# Patient Record
Sex: MALE | Race: WHITE | NOT HISPANIC OR LATINO
[De-identification: names, ages, dates, MRNs, and addresses within clinical notes are randomized per-mention and may not be internally consistent; named-entity substitution may affect disease eponyms.]

---

## 2017-03-07 ENCOUNTER — APPOINTMENT (OUTPATIENT)
Dept: UROLOGY | Facility: CLINIC | Age: 77
End: 2017-03-07

## 2017-03-07 DIAGNOSIS — R97.20 ELEVATED PROSTATE, SPECIFIC ANTIGEN [PSA]: ICD-10-CM

## 2017-03-07 DIAGNOSIS — Z00.00 ENCOUNTER FOR GENERAL ADULT MEDICAL EXAMINATION W/OUT ABNORMAL FINDINGS: ICD-10-CM

## 2017-03-07 LAB
PSA FREE FLD-MCNC: 19.3 %
PSA FREE SERPL-MCNC: 0.76 NG/ML
PSA SERPL-MCNC: 3.96 NG/ML

## 2017-03-08 LAB
APPEARANCE: CLEAR
BACTERIA: NEGATIVE
BILIRUBIN URINE: NEGATIVE
BLOOD URINE: NEGATIVE
COLOR: ABNORMAL
GLUCOSE QUALITATIVE U: NORMAL MG/DL
HYALINE CASTS: 0 /LPF
KETONES URINE: NEGATIVE
LEUKOCYTE ESTERASE URINE: NEGATIVE
MICROSCOPIC-UA: NORMAL
NITRITE URINE: NEGATIVE
PH URINE: 7.5
PROTEIN URINE: NEGATIVE MG/DL
RED BLOOD CELLS URINE: 2 /HPF
SPECIFIC GRAVITY URINE: 1.02
SQUAMOUS EPITHELIAL CELLS: 0 /HPF
UROBILINOGEN URINE: NORMAL MG/DL
WHITE BLOOD CELLS URINE: 0 /HPF

## 2017-03-11 LAB — CORE LAB FLUID CYTOLOGY: NORMAL

## 2017-07-31 ENCOUNTER — APPOINTMENT (OUTPATIENT)
Dept: ORTHOPEDIC SURGERY | Facility: CLINIC | Age: 77
End: 2017-07-31
Payer: MEDICARE

## 2017-07-31 VITALS
TEMPERATURE: 98.1 F | HEIGHT: 72 IN | DIASTOLIC BLOOD PRESSURE: 70 MMHG | WEIGHT: 188 LBS | HEART RATE: 77 BPM | SYSTOLIC BLOOD PRESSURE: 120 MMHG | BODY MASS INDEX: 25.47 KG/M2

## 2017-07-31 DIAGNOSIS — M16.11 UNILATERAL PRIMARY OSTEOARTHRITIS, RIGHT HIP: ICD-10-CM

## 2017-07-31 DIAGNOSIS — M25.851 OTHER SPECIFIED JOINT DISORDERS, RIGHT HIP: ICD-10-CM

## 2017-07-31 PROCEDURE — 99204 OFFICE O/P NEW MOD 45 MIN: CPT

## 2017-07-31 PROCEDURE — 73502 X-RAY EXAM HIP UNI 2-3 VIEWS: CPT | Mod: RT

## 2017-07-31 RX ORDER — FAMOTIDINE 20 MG/1
20 TABLET, FILM COATED ORAL
Qty: 30 | Refills: 0 | Status: ACTIVE | COMMUNITY
Start: 2017-07-12

## 2017-08-02 ENCOUNTER — FORM ENCOUNTER (OUTPATIENT)
Age: 77
End: 2017-08-02

## 2017-08-03 ENCOUNTER — OUTPATIENT (OUTPATIENT)
Dept: OUTPATIENT SERVICES | Facility: HOSPITAL | Age: 77
LOS: 1 days | End: 2017-08-03
Payer: MEDICARE

## 2017-08-03 ENCOUNTER — APPOINTMENT (OUTPATIENT)
Dept: RADIOLOGY | Facility: CLINIC | Age: 77
End: 2017-08-03
Payer: MEDICARE

## 2017-08-03 DIAGNOSIS — Z98.89 OTHER SPECIFIED POSTPROCEDURAL STATES: Chronic | ICD-10-CM

## 2017-08-03 DIAGNOSIS — Z41.9 ENCOUNTER FOR PROCEDURE FOR PURPOSES OTHER THAN REMEDYING HEALTH STATE, UNSPECIFIED: Chronic | ICD-10-CM

## 2017-08-03 DIAGNOSIS — Z00.8 ENCOUNTER FOR OTHER GENERAL EXAMINATION: ICD-10-CM

## 2017-08-03 DIAGNOSIS — Z98.49 CATARACT EXTRACTION STATUS, UNSPECIFIED EYE: Chronic | ICD-10-CM

## 2017-08-03 PROCEDURE — 27093 INJECTION FOR HIP X-RAY: CPT

## 2017-08-03 PROCEDURE — 73525 CONTRAST X-RAY OF HIP: CPT | Mod: 26,RT

## 2017-08-03 PROCEDURE — 27093 INJECTION FOR HIP X-RAY: CPT | Mod: RT

## 2017-08-03 PROCEDURE — 73525 CONTRAST X-RAY OF HIP: CPT

## 2017-08-04 ENCOUNTER — TRANSCRIPTION ENCOUNTER (OUTPATIENT)
Age: 77
End: 2017-08-04

## 2017-08-08 ENCOUNTER — APPOINTMENT (OUTPATIENT)
Dept: ORTHOPEDIC SURGERY | Facility: CLINIC | Age: 77
End: 2017-08-08
Payer: MEDICARE

## 2017-08-08 VITALS — HEART RATE: 90 BPM | SYSTOLIC BLOOD PRESSURE: 135 MMHG | DIASTOLIC BLOOD PRESSURE: 85 MMHG

## 2017-08-08 DIAGNOSIS — M16.12 UNILATERAL PRIMARY OSTEOARTHRITIS, LEFT HIP: ICD-10-CM

## 2017-08-08 DIAGNOSIS — M16.11 UNILATERAL PRIMARY OSTEOARTHRITIS, RIGHT HIP: ICD-10-CM

## 2017-08-08 PROCEDURE — 73522 X-RAY EXAM HIPS BI 3-4 VIEWS: CPT

## 2017-08-08 PROCEDURE — 99214 OFFICE O/P EST MOD 30 MIN: CPT

## 2018-07-28 PROBLEM — M16.11 PRIMARY LOCALIZED OSTEOARTHROSIS OF PELVIC REGION, RIGHT: Status: ACTIVE | Noted: 2017-08-08

## 2018-07-28 PROBLEM — M16.12 PRIMARY LOCALIZED OSTEOARTHROSIS OF PELVIC REGION, LEFT: Status: ACTIVE | Noted: 2017-08-08

## 2018-10-30 ENCOUNTER — APPOINTMENT (OUTPATIENT)
Dept: UROLOGY | Facility: CLINIC | Age: 78
End: 2018-10-30
Payer: MEDICARE

## 2018-10-30 PROCEDURE — 99214 OFFICE O/P EST MOD 30 MIN: CPT

## 2018-10-30 RX ORDER — SILDENAFIL 20 MG/1
20 TABLET ORAL
Qty: 90 | Refills: 3 | Status: ACTIVE | COMMUNITY
Start: 2018-10-30 | End: 1900-01-01

## 2018-10-30 RX ORDER — TADALAFIL 20 MG/1
20 TABLET ORAL
Qty: 12 | Refills: 3 | Status: ACTIVE | COMMUNITY
Start: 2018-10-30 | End: 1900-01-01

## 2018-10-31 LAB
APPEARANCE: CLEAR
BACTERIA: NEGATIVE
BILIRUBIN URINE: NEGATIVE
BLOOD URINE: NEGATIVE
COLOR: ABNORMAL
GLUCOSE QUALITATIVE U: NEGATIVE MG/DL
HYALINE CASTS: 0 /LPF
KETONES URINE: NEGATIVE
LEUKOCYTE ESTERASE URINE: NEGATIVE
MICROSCOPIC-UA: NORMAL
NITRITE URINE: NEGATIVE
PH URINE: 6.5
PROTEIN URINE: NEGATIVE MG/DL
RED BLOOD CELLS URINE: 3 /HPF
SPECIFIC GRAVITY URINE: 1.03
SQUAMOUS EPITHELIAL CELLS: 0 /HPF
UROBILINOGEN URINE: NEGATIVE MG/DL
WHITE BLOOD CELLS URINE: 0 /HPF

## 2018-11-02 LAB — CORE LAB FLUID CYTOLOGY: NORMAL

## 2019-11-13 ENCOUNTER — TRANSCRIPTION ENCOUNTER (OUTPATIENT)
Age: 79
End: 2019-11-13

## 2019-11-14 ENCOUNTER — APPOINTMENT (OUTPATIENT)
Dept: UROLOGY | Facility: CLINIC | Age: 79
End: 2019-11-14
Payer: MEDICARE

## 2019-11-14 PROCEDURE — 99214 OFFICE O/P EST MOD 30 MIN: CPT

## 2019-11-14 NOTE — PHYSICAL EXAM
[General Appearance - Well Developed] : well developed [General Appearance - Well Nourished] : well nourished [Normal Appearance] : normal appearance [Well Groomed] : well groomed [General Appearance - In No Acute Distress] : no acute distress [Abdomen Soft] : soft [Abdomen Tenderness] : non-tender [Costovertebral Angle Tenderness] : no ~M costovertebral angle tenderness [Urethral Meatus] : meatus normal [Urinary Bladder Findings] : the bladder was normal on palpation [Scrotum] : the scrotum was normal [Testes Mass (___cm)] : there were no testicular masses [No Prostate Nodules] : no prostate nodules [Edema] : no peripheral edema [] : no respiratory distress [Respiration, Rhythm And Depth] : normal respiratory rhythm and effort [Exaggerated Use Of Accessory Muscles For Inspiration] : no accessory muscle use [Oriented To Time, Place, And Person] : oriented to person, place, and time [Affect] : the affect was normal [Mood] : the mood was normal [Not Anxious] : not anxious [Normal Station and Gait] : the gait and station were normal for the patient's age [No Palpable Adenopathy] : no palpable adenopathy [No Focal Deficits] : no focal deficits

## 2019-11-15 LAB
APPEARANCE: CLEAR
BACTERIA: NEGATIVE
BILIRUBIN URINE: NEGATIVE
BLOOD URINE: NEGATIVE
COLOR: YELLOW
GLUCOSE QUALITATIVE U: NEGATIVE
HYALINE CASTS: 0 /LPF
KETONES URINE: NEGATIVE
LEUKOCYTE ESTERASE URINE: NEGATIVE
MICROSCOPIC-UA: NORMAL
NITRITE URINE: NEGATIVE
PH URINE: 6
PROTEIN URINE: NORMAL
PSA FREE FLD-MCNC: 11 %
PSA FREE SERPL-MCNC: 1.1 NG/ML
PSA SERPL-MCNC: 9.94 NG/ML
RED BLOOD CELLS URINE: 2 /HPF
SPECIFIC GRAVITY URINE: 1.03
SQUAMOUS EPITHELIAL CELLS: 1 /HPF
UROBILINOGEN URINE: NORMAL
WHITE BLOOD CELLS URINE: 3 /HPF

## 2020-01-17 LAB
PSA FREE FLD-MCNC: 22 %
PSA FREE SERPL-MCNC: 0.81 NG/ML
PSA SERPL-MCNC: 3.71 NG/ML

## 2020-02-07 ENCOUNTER — APPOINTMENT (OUTPATIENT)
Dept: OTOLARYNGOLOGY | Facility: CLINIC | Age: 80
End: 2020-02-07
Payer: MEDICARE

## 2020-02-07 VITALS
SYSTOLIC BLOOD PRESSURE: 131 MMHG | DIASTOLIC BLOOD PRESSURE: 82 MMHG | HEIGHT: 72 IN | HEART RATE: 71 BPM | WEIGHT: 186 LBS | BODY MASS INDEX: 25.19 KG/M2

## 2020-02-07 PROCEDURE — 69210 REMOVE IMPACTED EAR WAX UNI: CPT

## 2020-02-07 PROCEDURE — 99204 OFFICE O/P NEW MOD 45 MIN: CPT | Mod: 25

## 2020-02-07 RX ORDER — UBIDECARENONE 200 MG
CAPSULE ORAL
Refills: 0 | Status: ACTIVE | COMMUNITY

## 2020-02-07 RX ORDER — METOPROLOL TARTRATE 75 MG/1
TABLET, FILM COATED ORAL
Refills: 0 | Status: ACTIVE | COMMUNITY

## 2020-02-07 RX ORDER — OMEGA-3-ACID ETHYL ESTERS 1 G/1
CAPSULE, LIQUID FILLED ORAL
Refills: 0 | Status: ACTIVE | COMMUNITY

## 2020-02-07 NOTE — ASSESSMENT
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 At this point clinically not severe and it does not significant limitation in function, discuses what to look for in regards to signs of worsening hearing loss that may require re-evaluation. Also discussed behavioral techniques and environmental controls for improving function . They will follow up as needed or if hearing gets worse.\par pt would like to hold off on HA eval and would like to see rate of decline with serial exams\par will monitor, educted on what to look out for and risk of dementia if less engagement in activities

## 2020-02-07 NOTE — CONSULT LETTER
[FreeTextEntry1] : Dear Dr. CLIF DAMON \par I had the pleasure of evaluating your patient LINWOOD LEAVITT, thank you for allowing us to participate in their care. please see full note detailing our visit below.\par If you have any questions, please do not hesitate to call me and I would be happy to discuss further. \par \par Unruly Salas M.D.\par Attending Physician,  \par Department of Otolaryngology - Head and Neck Surgery\par [facility] \par Office: [phone]\par Fax: [phone]\par \par

## 2020-02-07 NOTE — HISTORY OF PRESENT ILLNESS
[de-identified] : pt with some mild subjective hearing loss, feel kirby snot limit function much, family concerned and wanted him to get an hearing test\par no Vertigo, tinnitus, pain, drainage or facial weakness.\par feels is b/l and long standing\par + hx loud noise exposure in the [employer]

## 2020-08-28 ENCOUNTER — APPOINTMENT (OUTPATIENT)
Dept: OTOLARYNGOLOGY | Facility: CLINIC | Age: 80
End: 2020-08-28

## 2020-10-02 ENCOUNTER — APPOINTMENT (OUTPATIENT)
Dept: OTOLARYNGOLOGY | Facility: CLINIC | Age: 80
End: 2020-10-02
Payer: MEDICARE

## 2020-10-02 VITALS — WEIGHT: 186 LBS | HEIGHT: 72 IN | TEMPERATURE: 97.3 F | BODY MASS INDEX: 25.19 KG/M2

## 2020-10-02 DIAGNOSIS — H90.3 SENSORINEURAL HEARING LOSS, BILATERAL: ICD-10-CM

## 2020-10-02 PROCEDURE — 69210 REMOVE IMPACTED EAR WAX UNI: CPT

## 2020-10-02 PROCEDURE — 99214 OFFICE O/P EST MOD 30 MIN: CPT | Mod: 25

## 2020-10-02 NOTE — END OF VISIT
[FreeTextEntry3] : I personally saw and examined LINWOOD LEAVITT in detail. I spoke to SYLVESTER Gómez regarding the assessment and plan of care.  I preformed the procedures and I reviewed the above assessment and plan of care, and agree. I have made changes in changes in the body of the note where appropriate.\par \par

## 2020-10-02 NOTE — REASON FOR VISIT
[Initial Consultation] : an initial consultation for [Hearing Loss] : hearing loss [FreeTextEntry2] : follow up

## 2020-10-02 NOTE — CONSULT LETTER
[Please see my note below.] : Please see my note below. [FreeTextEntry1] : Dear Dr. CLIF DAMON \par I had the pleasure of evaluating your patient LINWOOD LEAVITT, thank you for allowing us to participate in their care. please see full note detailing our visit below.\par If you have any questions, please do not hesitate to call me and I would be happy to discuss further. \par \par Unruly Salas M.D.\par Attending Physician,  \par Department of Otolaryngology - Head and Neck Surgery\par [facility] \par Office: [phone]\par Fax: [phone]\par \par

## 2020-10-02 NOTE — ASSESSMENT
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 At this point clinically not severe and it does not significant limitation in function, discuses what to look for in regards to signs of worsening hearing loss that may require re-evaluation. Also discussed behavioral techniques and environmental controls for improving function . They will follow up as needed or if hearing gets worse.\par pt would like to hold off on HA eval and would like to see rate of decline with serial exams\par will monitor, educted on what to look out for and risk of dementia if less engagement in activities, as well as possible QOL enhancement with HA use \par \par b/l CI \par patient declined audiogram today, will get one next visit \par ear hygiene\par discussed preventive measures and signs of accumulation\par

## 2020-11-12 ENCOUNTER — APPOINTMENT (OUTPATIENT)
Dept: UROLOGY | Facility: CLINIC | Age: 80
End: 2020-11-12
Payer: MEDICARE

## 2020-11-12 VITALS — TEMPERATURE: 97.3 F

## 2020-11-12 DIAGNOSIS — R97.20 ELEVATED PROSTATE, SPECIFIC ANTIGEN [PSA]: ICD-10-CM

## 2020-11-12 DIAGNOSIS — R35.0 FREQUENCY OF MICTURITION: ICD-10-CM

## 2020-11-12 PROCEDURE — 99214 OFFICE O/P EST MOD 30 MIN: CPT

## 2020-11-13 LAB
APPEARANCE: CLEAR
BACTERIA: NEGATIVE
BILIRUBIN URINE: NEGATIVE
BLOOD URINE: NEGATIVE
COLOR: NORMAL
GLUCOSE QUALITATIVE U: NEGATIVE
HYALINE CASTS: 0 /LPF
KETONES URINE: NEGATIVE
LEUKOCYTE ESTERASE URINE: NEGATIVE
MICROSCOPIC-UA: NORMAL
NITRITE URINE: NEGATIVE
PH URINE: 6
PROTEIN URINE: NEGATIVE
RED BLOOD CELLS URINE: 0 /HPF
SPECIFIC GRAVITY URINE: 1.01
SQUAMOUS EPITHELIAL CELLS: 0 /HPF
UROBILINOGEN URINE: NORMAL
WHITE BLOOD CELLS URINE: 0 /HPF

## 2020-11-24 DIAGNOSIS — Z20.828 CONTACT WITH AND (SUSPECTED) EXPOSURE TO OTHER VIRAL COMMUNICABLE DISEASES: ICD-10-CM

## 2020-11-26 LAB — SARS-COV-2 N GENE NPH QL NAA+PROBE: NOT DETECTED

## 2020-11-28 LAB — SARS-COV-2 N GENE NPH QL NAA+PROBE: NOT DETECTED

## 2021-01-22 RX ORDER — SILDENAFIL 20 MG/1
20 TABLET ORAL
Qty: 90 | Refills: 2 | Status: ACTIVE | COMMUNITY
Start: 2021-01-22 | End: 1900-01-01

## 2021-01-22 RX ORDER — SILDENAFIL 100 MG/1
100 TABLET, FILM COATED ORAL
Qty: 30 | Refills: 7 | Status: ACTIVE | COMMUNITY
Start: 2021-01-22 | End: 1900-01-01

## 2021-04-02 ENCOUNTER — APPOINTMENT (OUTPATIENT)
Dept: OTOLARYNGOLOGY | Facility: CLINIC | Age: 81
End: 2021-04-02

## 2021-07-08 LAB
PSA FREE FLD-MCNC: 16 %
PSA FREE SERPL-MCNC: 0.91 NG/ML
PSA SERPL-MCNC: 5.51 NG/ML

## 2021-08-14 RX ORDER — TADALAFIL 20 MG/1
20 TABLET ORAL
Qty: 30 | Refills: 11 | Status: ACTIVE | COMMUNITY
Start: 2021-08-14 | End: 1900-01-01

## 2021-11-18 ENCOUNTER — APPOINTMENT (OUTPATIENT)
Dept: UROLOGY | Facility: CLINIC | Age: 81
End: 2021-11-18
Payer: MEDICARE

## 2021-11-18 PROCEDURE — 99214 OFFICE O/P EST MOD 30 MIN: CPT

## 2021-11-19 LAB
APPEARANCE: CLEAR
BACTERIA: NEGATIVE
BILIRUBIN URINE: NEGATIVE
BLOOD URINE: NEGATIVE
COLOR: YELLOW
GLUCOSE QUALITATIVE U: NEGATIVE
HYALINE CASTS: 0 /LPF
KETONES URINE: NEGATIVE
LEUKOCYTE ESTERASE URINE: NEGATIVE
MICROSCOPIC-UA: NORMAL
NITRITE URINE: NEGATIVE
PH URINE: 5.5
PROTEIN URINE: NORMAL
PSA FREE FLD-MCNC: 18 %
PSA FREE SERPL-MCNC: 1.07 NG/ML
PSA SERPL-MCNC: 5.88 NG/ML
RED BLOOD CELLS URINE: 1 /HPF
SPECIFIC GRAVITY URINE: 1.03
SQUAMOUS EPITHELIAL CELLS: 0 /HPF
UROBILINOGEN URINE: NORMAL
WHITE BLOOD CELLS URINE: 1 /HPF

## 2022-08-16 ENCOUNTER — NON-APPOINTMENT (OUTPATIENT)
Age: 82
End: 2022-08-16

## 2022-08-17 ENCOUNTER — APPOINTMENT (OUTPATIENT)
Dept: ORTHOPEDIC SURGERY | Facility: CLINIC | Age: 82
End: 2022-08-17

## 2022-08-17 PROCEDURE — 99204 OFFICE O/P NEW MOD 45 MIN: CPT

## 2022-08-17 NOTE — ADDENDUM
[FreeTextEntry1] : I, Fernanda Mendoza, acted solely as a scribe for Dr. Kanu Henrández on this date 08/17/2022.\par \par All medical record entries made by the Scribe were at my, Dr. Kanu Hernández, direction and personally dictated by me on 08/17/2022. I have reviewed the chart and agree that the record accurately reflects my personal performance of the history, physical exam, assessment and plan. I have also personally directed, reviewed, and agreed with the chart.	\par

## 2022-08-17 NOTE — REASON FOR VISIT
[Initial Visit] : an initial visit for [Spouse] : spouse [FreeTextEntry2] : right posterior ankle pain

## 2022-08-17 NOTE — DISCUSSION/SUMMARY
[de-identified] : Today I had a lengthy discussion with the patient regarding  chronic right ankle pain. I have addressed all the patient's concerns surrounding the pathology of their condition. XR imaging results were reviewed with the patient. At this time I would like to obtain advanced imaging of the patient's right ankle. An MRI was ordered so I can find out more about the etiology of the patient's condition. The patient should follow up with the office after obtaining the MRI. Further, I advised the patient to utilize gel cup inserts in the heels of their shoes. The patient understood and verbally agreed to the treatment plan. All of their questions were answered and they were satisfied with the visit. The patient should call the office if they have any questions or experience worsening symptoms.

## 2022-08-17 NOTE — PHYSICAL EXAM
[de-identified] : General: Alert and oriented x3. In no acute distress. Pleasant in nature with a normal affect. No apparent respiratory distress.\par \par Right Ankle Exam\par Skin: Clean, dry, intact\par Inspection: Posterior prominence over the Achilles. No obvious malalignment, + swelling, no effusion; no lymphadenopathy\par Pulses: 2+ DP/PT pulses\par ROM: 10 degrees of dorsiflexion, 40 degrees of plantarflexion, 10 degrees of subtalar motion\par Tenderness: Tenderness over the posterior Achilles. Tenderness over the peroneals. No tenderness over the lateral malleolus, no CFL/ATFL/PTFL pain. No medial malleolus pain, no deltoid ligament pain. No proximal fibular pain. No heel pain.\par Stability: Negative anterior/posterior drawer.\par Strength: 5/5 TA/GS/EHL\par Neuro: In tact to light touch throughout\par Additional tests: Negative Mortons test, Negative syndesmosis squeeze test. [de-identified] : Xrays of right foot obtained from [facility] on 6/23/22 and reviewed in the office today, 08/17/2022 , revealed:\par Prominent soft tissue swelling is seen in the region of the Achilles tendon insertion.  There is calcaneal spurring.  The ankle mortise is congruent and the tibiotalar joint is preserved.  Mild bony proliferation is seen at the medial malleolus.  Mild first MTP OA.  No acute osseous abnormality.  \par

## 2022-08-17 NOTE — HISTORY OF PRESENT ILLNESS
[FreeTextEntry1] : The patient is a 81 year old male presenting with his spouse for an initial evaluation of chronic right ankle pain. Patient reports waxing and waning pain to the posterior aspect of his right ankle over his Achilles. The patient cannot attribute their pain to any injury, fall, or trauma.  He states that he is very active, noting that he exercises 300 minutes weekly. He currently attends physical therapy once weekly for this with minimal improvement in his pain scale. He does take Advil PRN for pain. The patient was previously evaluated for this by Dr. Bernal's PA at [facility] where he was treated conservatively for Achilles tendinosis. He presents today for a second opinion due to limited improvement with conservative treatment. The patient states that his pain becomes so severe that he is unable to walk at times. He confirms history of hip replacement completed at [facility]. The patient presents wearing boat shoes and is walking without assistance. No other complaints. \par

## 2022-08-29 ENCOUNTER — OUTPATIENT (OUTPATIENT)
Dept: OUTPATIENT SERVICES | Facility: HOSPITAL | Age: 82
LOS: 1 days | End: 2022-08-29
Payer: MEDICARE

## 2022-08-29 ENCOUNTER — APPOINTMENT (OUTPATIENT)
Dept: MRI IMAGING | Facility: CLINIC | Age: 82
End: 2022-08-29

## 2022-08-29 DIAGNOSIS — Z41.9 ENCOUNTER FOR PROCEDURE FOR PURPOSES OTHER THAN REMEDYING HEALTH STATE, UNSPECIFIED: Chronic | ICD-10-CM

## 2022-08-29 DIAGNOSIS — M67.88 OTHER SPECIFIED DISORDERS OF SYNOVIUM AND TENDON, OTHER SITE: ICD-10-CM

## 2022-08-29 DIAGNOSIS — M25.571 PAIN IN RIGHT ANKLE AND JOINTS OF RIGHT FOOT: ICD-10-CM

## 2022-08-29 DIAGNOSIS — Z98.49 CATARACT EXTRACTION STATUS, UNSPECIFIED EYE: Chronic | ICD-10-CM

## 2022-08-29 DIAGNOSIS — Z98.89 OTHER SPECIFIED POSTPROCEDURAL STATES: Chronic | ICD-10-CM

## 2022-08-29 PROCEDURE — 73721 MRI JNT OF LWR EXTRE W/O DYE: CPT

## 2022-08-29 PROCEDURE — 73721 MRI JNT OF LWR EXTRE W/O DYE: CPT | Mod: 26,RT,MH

## 2022-09-12 ENCOUNTER — APPOINTMENT (OUTPATIENT)
Dept: ORTHOPEDIC SURGERY | Facility: CLINIC | Age: 82
End: 2022-09-12

## 2022-09-12 DIAGNOSIS — M25.571 PAIN IN RIGHT ANKLE AND JOINTS OF RIGHT FOOT: ICD-10-CM

## 2022-09-12 DIAGNOSIS — G89.29 PAIN IN RIGHT ANKLE AND JOINTS OF RIGHT FOOT: ICD-10-CM

## 2022-09-12 PROCEDURE — 99214 OFFICE O/P EST MOD 30 MIN: CPT

## 2022-09-12 NOTE — ADDENDUM
[FreeTextEntry1] : I, Fernanda Mendoza, acted solely as a scribe for Dr. Kanu Hernández on this date 09/12/2022.\par \par All medical record entries made by the Scribe were at my, Dr. Kanu Hernández, direction and personally dictated by me on 09/12/2022. I have reviewed the chart and agree that the record accurately reflects my personal performance of the history, physical exam, assessment and plan. I have also personally directed, reviewed, and agreed with the chart.	\par

## 2022-09-12 NOTE — REASON FOR VISIT
[Follow-Up Visit] : a follow-up visit for [Spouse] : spouse [FreeTextEntry2] : right posterior ankle pain

## 2022-09-12 NOTE — HISTORY OF PRESENT ILLNESS
[FreeTextEntry1] : 9/12/2022: The patient returns to the office with his spouse to review MRI results of the right ankle. Pain is localized posteriorly, unchanged from prior evaluation. He reports concerns of swelling to his lower extremities, stating that he utilizes compression socks with improvement. The patient states that he has reduced his exercise since his last evaluation. He is wearing sneakers and is walking without assistance. He does report concerns with his gait in the office today. He has tried Voltaren gel with no relief. No other complaints. \par \par 8/17/2022: The patient is a 81 year old male presenting with his spouse for an initial evaluation of chronic right ankle pain. Patient reports waxing and waning pain to the posterior aspect of his right ankle over his Achilles. The patient cannot attribute their pain to any injury, fall, or trauma.  He states that he is very active, noting that he exercises 300 minutes weekly. He currently attends physical therapy once weekly for this with minimal improvement in his pain scale. He does take Advil PRN for pain. The patient was previously evaluated for this by Dr. Bernal's PA at [facility] where he was treated conservatively for Achilles tendinosis. He presents today for a second opinion due to limited improvement with conservative treatment. The patient states that his pain becomes so severe that he is unable to walk at times. He confirms history of hip replacement completed at [facility]. The patient presents wearing boat shoes and is walking without assistance. No other complaints. \par

## 2022-09-12 NOTE — PHYSICAL EXAM
[de-identified] : General: Alert and oriented x3. In no acute distress. Pleasant in nature with a normal affect. No apparent respiratory distress.\par \par Right Ankle Exam\par Skin: Clean, dry, intact\par Inspection: Posterior prominence over the Achilles. No obvious malalignment, + swelling, no effusion; no lymphadenopathy\par Pulses: 2+ DP/PT pulses\par ROM: 10 degrees of dorsiflexion, 40 degrees of plantarflexion, 10 degrees of subtalar motion\par Tenderness: Tenderness over the posterior Achilles. Tenderness over the peroneals. No tenderness over the lateral malleolus, no CFL/ATFL/PTFL pain. No medial malleolus pain, no deltoid ligament pain. No proximal fibular pain. No heel pain.\par Stability: Negative anterior/posterior drawer.\par Strength: 5/5 TA/GS/EHL\par Neuro: In tact to light touch throughout\par Additional tests: Negative Mortons test, Negative syndesmosis squeeze test. [de-identified] : EXAM: [id number] - MR ANKLE RT  - ORDERED BY:  PAVAN BOYKIN\par \par PROCEDURE DATE:  08/29/2022\par \par \par \par INTERPRETATION:   History: Ankle pain\par \par Technique: Multiplanar and multisequence MRI images were obtained through the right ankle without contrast.\par \par Comparison: None available\par \par Findings:\par \par OSSEOUS STRUCTURES:\par No acute fracture. Cystic change within the medial malleolus, which may be posttraumatic or traction-related.\par \par LIGAMENTS: The anterior and posterior inferior tibiofibular ligaments are intact. Diminutive appearance of the anterior talofibular ligament from prior partial tear. Chronic scar remodeling of the calcaneofibular ligament. Posterior talofibular ligament is intact. Chronic scar remodeling of the deltoid ligamentous complex.\par \par TENDONS: Mild tendinosis of the inframalleolar peroneal tendons. Long flexor tendons are intact. Long extensor tendons are intact. Moderate distal Achilles tendinosis with low-grade deep surface tearing at its insertion. Moderate retrocalcaneal bursitis with reactive osseous edema at the Achilles tendon insertion. Plantar fascia is intact. Small plantar calcaneal spur. Small plantar fibroma along the proximal central cord of plantar fascia, measuring 7 x 6 mm transaxially and up to 2 cm proximal to distal.\par \par SOFT TISSUES:\par No abnormalities are identified in the tarsal tunnel. No abnormalities are identified in the tarsal sinus. Mild/moderate fatty atrophy of the abductor hallucis muscle and abductor digiti minimi muscle.\par \par JOINTS: Talar dome is intact.\par \par Impression:\par \par Moderate distal Achilles tendinosis with low-grade deep surface tearing at its insertion. Reactive osseous edema at the Achilles tendon insertion. Moderate retrocalcaneal bursitis.\par \par Mild tendinosis of the inframalleolar peroneal tendons.\par \par Small plantar fibroma along the proximal central cord of plantar fascia.\par \par Sequela of prior lateral and deltoid ligamentous complex injuries, as described above.\par \par --- End of Report ---\par \par \par DRU GAN M.D., ATTENDING RADIOLOGIST\par This document has been electronically signed. Sep  7 2022 12:35PM

## 2022-09-12 NOTE — DISCUSSION/SUMMARY
[de-identified] : Today I had a lengthy discussion with the patient regarding their chronic right ankle pain. I have addressed all the patient's concerns surrounding the pathology of their condition. MRI results were reviewed with the patient today in the office. At this time, I recommended that the patient continue to utilize the Support Hose socks for swelling control. I recommend that the patient undergo a course of physical therapy for the right ankle 2-3 times a week for a total of 8-12 weeks. A prescription was given for the physical therapy today. I also recommend that the patient perform a home exercise program for the lower extremities. I advised that the patient utilize Voltaren gel topically. If the Voltaren gel could not be obtained, Icy Hot, Biofreeze, or Bengay can be utilized instead. I recommend that the patient utilize ice, NSAIDs, and heat PRN. They can also elevate their right ankle above the level of the heart. I advised the patient to utilize gel cup inserts in the heels of their shoes.\par \par A discussion was had about shoe-wear modifications. I advised the patient to utilize a wide toed cross training sneaker that better accommodates the feet. I recommended New Balance, Romano, Hoka, Asics, or Saucony to the patient. 		\par \par The patient understood and verbally agreed to the treatment plan. All of their questions were answered and they were satisfied with the visit. The patient should call the office if they have any questions or experience worsening symptoms. I would like to see the patient back in the office in 2-3 months to reassess their condition. 				\par

## 2022-11-22 ENCOUNTER — OFFICE (OUTPATIENT)
Dept: URBAN - METROPOLITAN AREA CLINIC 102 | Facility: CLINIC | Age: 82
Setting detail: OPHTHALMOLOGY
End: 2022-11-22
Payer: MEDICARE

## 2022-11-22 VITALS — HEIGHT: 60 IN

## 2022-11-22 DIAGNOSIS — Q14.1: ICD-10-CM

## 2022-11-22 DIAGNOSIS — H01.002: ICD-10-CM

## 2022-11-22 DIAGNOSIS — H40.013: ICD-10-CM

## 2022-11-22 DIAGNOSIS — H16.223: ICD-10-CM

## 2022-11-22 DIAGNOSIS — Z96.1: ICD-10-CM

## 2022-11-22 DIAGNOSIS — H43.813: ICD-10-CM

## 2022-11-22 DIAGNOSIS — H26.493: ICD-10-CM

## 2022-11-22 DIAGNOSIS — H01.005: ICD-10-CM

## 2022-11-22 PROCEDURE — 92014 COMPRE OPH EXAM EST PT 1/>: CPT | Performed by: OPHTHALMOLOGY

## 2022-11-22 PROCEDURE — 92083 EXTENDED VISUAL FIELD XM: CPT | Performed by: OPHTHALMOLOGY

## 2022-11-22 ASSESSMENT — LID EXAM ASSESSMENTS
OD_BLEPHARITIS: RLL T
OS_BLEPHARITIS: LLL T

## 2022-11-22 ASSESSMENT — CONFRONTATIONAL VISUAL FIELD TEST (CVF)
OS_FINDINGS: FULL
OD_FINDINGS: FULL

## 2022-11-22 ASSESSMENT — AXIALLENGTH_DERIVED
OS_AL: 22.9847
OS_AL: 23.031
OD_AL: 22.7636
OD_AL: 23.039

## 2022-11-22 ASSESSMENT — TONOMETRY
OS_IOP_MMHG: 15
OS_IOP_MMHG: 20
OD_IOP_MMHG: 15
OD_IOP_MMHG: 15

## 2022-11-22 ASSESSMENT — KERATOMETRY
OS_K1POWER_DIOPTERS: 45.75
OD_AXISANGLE_DEGREES: 140
OS_K2POWER_DIOPTERS: 46.50
OD_K1POWER_DIOPTERS: 46.00
OD_K2POWER_DIOPTERS: 47.00
OS_AXISANGLE_DEGREES: 041

## 2022-11-22 ASSESSMENT — VISUAL ACUITY
OD_BCVA: 20/25-
OS_BCVA: 20/20-

## 2022-11-22 ASSESSMENT — REFRACTION_AUTOREFRACTION
OD_SPHERE: -0.25
OS_CYLINDER: -0.75
OD_CYLINDER: -0.75
OS_AXIS: 045
OD_AXIS: 030
OS_SPHERE: -0.50

## 2022-11-22 ASSESSMENT — DECREASING TEAR LAKE - SEVERITY SCORE
OS_DEC_TEARLAKE: 2+
OD_DEC_TEARLAKE: 2+

## 2022-11-22 ASSESSMENT — SPHEQUIV_DERIVED
OD_SPHEQUIV: -1.375
OS_SPHEQUIV: -0.875
OS_SPHEQUIV: -1
OD_SPHEQUIV: -0.625

## 2022-11-22 ASSESSMENT — REFRACTION_MANIFEST
OS_CYLINDER: -1.00
OS_AXIS: 058
OD_SPHERE: -0.75
OD_CYLINDER: -1.25
OD_AXIS: 169
OS_VA1: 20/40+
OS_SPHERE: -0.50
OD_VA1: 20/40-1

## 2022-12-02 LAB
APPEARANCE: CLEAR
BACTERIA UR CULT: NORMAL
BACTERIA: NEGATIVE
BILIRUBIN URINE: NEGATIVE
BLOOD URINE: NEGATIVE
COLOR: YELLOW
GLUCOSE QUALITATIVE U: NEGATIVE
HYALINE CASTS: 0 /LPF
KETONES URINE: NEGATIVE
LEUKOCYTE ESTERASE URINE: ABNORMAL
MICROSCOPIC-UA: NORMAL
NITRITE URINE: NEGATIVE
PH URINE: 6
PROTEIN URINE: NORMAL
RED BLOOD CELLS URINE: 3 /HPF
SPECIFIC GRAVITY URINE: 1.02
SQUAMOUS EPITHELIAL CELLS: 0 /HPF
UROBILINOGEN URINE: NORMAL
WHITE BLOOD CELLS URINE: 2 /HPF

## 2022-12-12 ENCOUNTER — APPOINTMENT (OUTPATIENT)
Dept: ORTHOPEDIC SURGERY | Facility: CLINIC | Age: 82
End: 2022-12-12

## 2022-12-12 PROCEDURE — 99213 OFFICE O/P EST LOW 20 MIN: CPT

## 2022-12-14 NOTE — PHYSICAL EXAM
[de-identified] : General: Alert and oriented x3. In no acute distress. Pleasant in nature with a normal affect. No apparent respiratory distress.\par \par Right Ankle Exam\par Skin: Clean, dry, intact\par Inspection: Posterior prominence over the Achilles. No obvious malalignment, + swelling, no effusion; no lymphadenopathy\par Pulses: 2+ DP/PT pulses\par ROM: 10 degrees of dorsiflexion, 40 degrees of plantarflexion, 10 degrees of subtalar motion\par Tenderness: Tenderness over the posterior Achilles. Tenderness over the peroneals. No tenderness over the lateral malleolus, no CFL/ATFL/PTFL pain. No medial malleolus pain, no deltoid ligament pain. No proximal fibular pain. No heel pain.\par Stability: Negative anterior/posterior drawer.\par Strength: 5/5 TA/GS/EHL\par Neuro: In tact to light touch throughout\par Additional tests: Negative Mortons test, Negative syndesmosis squeeze test. [de-identified] : EXAM: [id number] - MR ANKLE RT  - ORDERED BY:  PAVAN BOYKIN\par \par PROCEDURE DATE:  08/29/2022\par \par \par \par INTERPRETATION:   History: Ankle pain\par \par Technique: Multiplanar and multisequence MRI images were obtained through the right ankle without contrast.\par \par Comparison: None available\par \par Findings:\par \par OSSEOUS STRUCTURES:\par No acute fracture. Cystic change within the medial malleolus, which may be posttraumatic or traction-related.\par \par LIGAMENTS: The anterior and posterior inferior tibiofibular ligaments are intact. Diminutive appearance of the anterior talofibular ligament from prior partial tear. Chronic scar remodeling of the calcaneofibular ligament. Posterior talofibular ligament is intact. Chronic scar remodeling of the deltoid ligamentous complex.\par \par TENDONS: Mild tendinosis of the inframalleolar peroneal tendons. Long flexor tendons are intact. Long extensor tendons are intact. Moderate distal Achilles tendinosis with low-grade deep surface tearing at its insertion. Moderate retrocalcaneal bursitis with reactive osseous edema at the Achilles tendon insertion. Plantar fascia is intact. Small plantar calcaneal spur. Small plantar fibroma along the proximal central cord of plantar fascia, measuring 7 x 6 mm transaxially and up to 2 cm proximal to distal.\par \par SOFT TISSUES:\par No abnormalities are identified in the tarsal tunnel. No abnormalities are identified in the tarsal sinus. Mild/moderate fatty atrophy of the abductor hallucis muscle and abductor digiti minimi muscle.\par \par JOINTS: Talar dome is intact.\par \par Impression:\par \par Moderate distal Achilles tendinosis with low-grade deep surface tearing at its insertion. Reactive osseous edema at the Achilles tendon insertion. Moderate retrocalcaneal bursitis.\par \par Mild tendinosis of the inframalleolar peroneal tendons.\par \par Small plantar fibroma along the proximal central cord of plantar fascia.\par \par Sequela of prior lateral and deltoid ligamentous complex injuries, as described above.\par \par --- End of Report ---\par \par \par DRU GAN M.D., ATTENDING RADIOLOGIST\par This document has been electronically signed. Sep  7 2022 12:35PM

## 2022-12-14 NOTE — DISCUSSION/SUMMARY
[de-identified] : Today I had a lengthy discussion with the patient regarding their chronic right ankle pain. I have addressed all the patient's concerns surrounding the pathology of their condition. MRI results were reviewed with the patient today in the office. At this time, I recommended that the patient continue to utilize the Support Hose socks for swelling control. I recommend that the patient undergo a course of physical therapy for the right ankle 2-3 times a week for a total of 8-12 weeks. A prescription was given for the physical therapy today. I also recommend that the patient perform a home exercise program for the lower extremities. I advised that the patient utilize Voltaren gel topically. If the Voltaren gel could not be obtained, Icy Hot, Biofreeze, or Bengay can be utilized instead. I recommend that the patient utilize ice, NSAIDs, and heat PRN. They can also elevate their right ankle above the level of the heart. I advised the patient to utilize gel cup inserts in the heels of their shoes.\par \par A discussion was had about shoe-wear modifications. I advised the patient to utilize a wide toed cross training sneaker that better accommodates the feet. I recommended New Balance, Romano, Hoka, Asics, or Saucony to the patient. 		\par \par The patient understood and verbally agreed to the treatment plan. All of their questions were answered and they were satisfied with the visit. The patient should call the office if they have any questions or experience worsening symptoms. I would like to see the patient back in the office in 2-3 months to reassess their condition. 				\par

## 2022-12-14 NOTE — HISTORY OF PRESENT ILLNESS
[FreeTextEntry1] : 9/12/2022: The patient returns to the office with his spouse to review MRI results of the right ankle. Pain is localized posteriorly, unchanged from prior evaluation. He reports concerns of swelling to his lower extremities, stating that he utilizes compression socks with improvement. The patient states that he has reduced his exercise since his last evaluation. He is wearing sneakers and is walking without assistance. He does report concerns with his gait in the office today. He has tried Voltaren gel with no relief. No other complaints. \par \par 8/17/2022: The patient is a 81 year old male presenting with his spouse for an initial evaluation of chronic right ankle pain. Patient reports waxing and waning pain to the posterior aspect of his right ankle over his Achilles. The patient cannot attribute their pain to any injury, fall, or trauma.  He states that he is very active, noting that he exercises 300 minutes weekly. He currently attends physical therapy once weekly for this with minimal improvement in his pain scale. He does take Advil PRN for pain. The patient was previously evaluated for this by Dr. Bernal's PA at [facility] where he was treated conservatively for Achilles tendinosis. He presents today for a second opinion due to limited improvement with conservative treatment. The patient states that his pain becomes so severe that he is unable to walk at times. He confirms history of hip replacement completed at [facility]. The patient presents wearing boat shoes and is walking without assistance. No other complaints. \par

## 2022-12-14 NOTE — ADDENDUM
[FreeTextEntry1] : I, Fernanda Mendoza, acted solely as a scribe for Dr. Kanu Hernández on this date 12/12/2022.\par \par All medical record entries made by the Scribe were at my, Dr. Kanu Hernández, direction and personally dictated by me on 12/12/2022. I have reviewed the chart and agree that the record accurately reflects my personal performance of the history, physical exam, assessment and plan. I have also personally directed, reviewed, and agreed with the chart.	\par

## 2022-12-14 NOTE — DISCUSSION/SUMMARY
[de-identified] : Today I had a lengthy discussion with the patient regarding their chronic right ankle pain. I have addressed all the patient's concerns surrounding the pathology of their condition. MRI results were reviewed with the patient today in the office. At this time, I recommended that the patient continue to utilize the Support Hose socks for swelling control. I recommend that the patient undergo a course of physical therapy for the right ankle 2-3 times a week for a total of 8-12 weeks. A prescription was given for the physical therapy today. I also recommend that the patient perform a home exercise program for the lower extremities. I advised that the patient utilize Voltaren gel topically. If the Voltaren gel could not be obtained, Icy Hot, Biofreeze, or Bengay can be utilized instead. I recommend that the patient utilize ice, NSAIDs, and heat PRN. They can also elevate their right ankle above the level of the heart. I advised the patient to utilize gel cup inserts in the heels of their shoes.\par \par A discussion was had about shoe-wear modifications. I advised the patient to utilize a wide toed cross training sneaker that better accommodates the feet. I recommended New Balance, Romano, Hoka, Asics, or Saucony to the patient. 		\par \par The patient understood and verbally agreed to the treatment plan. All of their questions were answered and they were satisfied with the visit. The patient should call the office if they have any questions or experience worsening symptoms. I would like to see the patient back in the office in 2-3 months to reassess their condition. 				\par

## 2022-12-14 NOTE — HISTORY OF PRESENT ILLNESS
[FreeTextEntry1] : 9/12/2022: The patient returns to the office with his spouse to review MRI results of the right ankle. Pain is localized posteriorly, unchanged from prior evaluation. He reports concerns of swelling to his lower extremities, stating that he utilizes compression socks with improvement. The patient states that he has reduced his exercise since his last evaluation. He is wearing sneakers and is walking without assistance. He does report concerns with his gait in the office today. He has tried Voltaren gel with no relief. No other complaints. \par \par 8/17/2022: The patient is a 81 year old male presenting with his spouse for an initial evaluation of chronic right ankle pain. Patient reports waxing and waning pain to the posterior aspect of his right ankle over his Achilles. The patient cannot attribute their pain to any injury, fall, or trauma.  He states that he is very active, noting that he exercises 300 minutes weekly. He currently attends physical therapy once weekly for this with minimal improvement in his pain scale. He does take Advil PRN for pain. The patient was previously evaluated for this by Dr. Bernal's PA at [facility] where he was treated conservatively for Achilles tendinosis. He presents today for a second opinion due to limited improvement with conservative treatment. The patient states that his pain becomes so severe that he is unable to walk at times. He confirms history of hip replacement completed at [facility]. The patient presents wearing boat shoes and is walking without assistance. No other complaints. \par

## 2022-12-14 NOTE — PHYSICAL EXAM
[de-identified] : General: Alert and oriented x3. In no acute distress. Pleasant in nature with a normal affect. No apparent respiratory distress.\par \par Right Ankle Exam\par Skin: Clean, dry, intact\par Inspection: Posterior prominence over the Achilles. No obvious malalignment, + swelling, no effusion; no lymphadenopathy\par Pulses: 2+ DP/PT pulses\par ROM: 10 degrees of dorsiflexion, 40 degrees of plantarflexion, 10 degrees of subtalar motion\par Tenderness: Tenderness over the posterior Achilles. Tenderness over the peroneals. No tenderness over the lateral malleolus, no CFL/ATFL/PTFL pain. No medial malleolus pain, no deltoid ligament pain. No proximal fibular pain. No heel pain.\par Stability: Negative anterior/posterior drawer.\par Strength: 5/5 TA/GS/EHL\par Neuro: In tact to light touch throughout\par Additional tests: Negative Mortons test, Negative syndesmosis squeeze test. [de-identified] : EXAM: [id number] - MR ANKLE RT  - ORDERED BY:  PAVAN BOYKIN\par \par PROCEDURE DATE:  08/29/2022\par \par \par \par INTERPRETATION:   History: Ankle pain\par \par Technique: Multiplanar and multisequence MRI images were obtained through the right ankle without contrast.\par \par Comparison: None available\par \par Findings:\par \par OSSEOUS STRUCTURES:\par No acute fracture. Cystic change within the medial malleolus, which may be posttraumatic or traction-related.\par \par LIGAMENTS: The anterior and posterior inferior tibiofibular ligaments are intact. Diminutive appearance of the anterior talofibular ligament from prior partial tear. Chronic scar remodeling of the calcaneofibular ligament. Posterior talofibular ligament is intact. Chronic scar remodeling of the deltoid ligamentous complex.\par \par TENDONS: Mild tendinosis of the inframalleolar peroneal tendons. Long flexor tendons are intact. Long extensor tendons are intact. Moderate distal Achilles tendinosis with low-grade deep surface tearing at its insertion. Moderate retrocalcaneal bursitis with reactive osseous edema at the Achilles tendon insertion. Plantar fascia is intact. Small plantar calcaneal spur. Small plantar fibroma along the proximal central cord of plantar fascia, measuring 7 x 6 mm transaxially and up to 2 cm proximal to distal.\par \par SOFT TISSUES:\par No abnormalities are identified in the tarsal tunnel. No abnormalities are identified in the tarsal sinus. Mild/moderate fatty atrophy of the abductor hallucis muscle and abductor digiti minimi muscle.\par \par JOINTS: Talar dome is intact.\par \par Impression:\par \par Moderate distal Achilles tendinosis with low-grade deep surface tearing at its insertion. Reactive osseous edema at the Achilles tendon insertion. Moderate retrocalcaneal bursitis.\par \par Mild tendinosis of the inframalleolar peroneal tendons.\par \par Small plantar fibroma along the proximal central cord of plantar fascia.\par \par Sequela of prior lateral and deltoid ligamentous complex injuries, as described above.\par \par --- End of Report ---\par \par \par DRU GAN M.D., ATTENDING RADIOLOGIST\par This document has been electronically signed. Sep  7 2022 12:35PM

## 2023-01-05 DIAGNOSIS — M67.88 OTHER SPECIFIED DISORDERS OF SYNOVIUM AND TENDON, OTHER SITE: ICD-10-CM

## 2023-03-07 ENCOUNTER — APPOINTMENT (OUTPATIENT)
Dept: UROLOGY | Facility: CLINIC | Age: 83
End: 2023-03-07

## 2023-04-19 ENCOUNTER — NON-APPOINTMENT (OUTPATIENT)
Age: 83
End: 2023-04-19

## 2023-04-20 ENCOUNTER — NON-APPOINTMENT (OUTPATIENT)
Age: 83
End: 2023-04-20

## 2023-04-24 ENCOUNTER — APPOINTMENT (OUTPATIENT)
Dept: CARDIOLOGY | Facility: CLINIC | Age: 83
End: 2023-04-24
Payer: MEDICARE

## 2023-04-24 VITALS
BODY MASS INDEX: 25.87 KG/M2 | HEART RATE: 113 BPM | HEIGHT: 72 IN | DIASTOLIC BLOOD PRESSURE: 117 MMHG | OXYGEN SATURATION: 96 % | WEIGHT: 191 LBS | SYSTOLIC BLOOD PRESSURE: 166 MMHG

## 2023-04-24 DIAGNOSIS — Z87.891 PERSONAL HISTORY OF NICOTINE DEPENDENCE: ICD-10-CM

## 2023-04-24 PROCEDURE — 99204 OFFICE O/P NEW MOD 45 MIN: CPT

## 2023-04-24 NOTE — REASON FOR VISIT
[Initial Evaluation] : an initial evaluation of [FreeTextEntry1] : 4/27/23\par \par 82 year old pleasant Male with PMHX of Afib, with history of swelling to the left leg more than right leg prior to recent injury.\par \par Recent Fall in February in [place]. With subsequent swelling to L leg, knee and foot. XR negative for fracture and LE venous duplex negative for DVT at [facility] in [place]. \par \par Having routine PSA check with urology.\par Last colonoscopy 3 years ago normal. \par \par Reduced swelling in the legs in the morning. \par Has has steroid injections post return from [place].\par Exercises 3 times weekly.\par Routinely wears compression garments. \par \par Medications:\par Eliquis 5 mg twice daily \par Lovaza \par Metoprolol 25 mg, one in AM, two in PM

## 2023-04-24 NOTE — PHYSICAL EXAM
[Heart Rate And Rhythm] : heart rate and rhythm were normal [Heart Sounds] : normal S1 and S2 [Respiration, Rhythm And Depth] : normal respiratory rhythm and effort [Auscultation Breath Sounds / Voice Sounds] : lungs were clear to auscultation bilaterally [Bowel Sounds] : normal bowel sounds [Abdomen Soft] : soft [Abdomen Tenderness] : non-tender [Abnormal Walk] : normal gait [] : no ischemic changes [No Skin Ulcers] : no skin ulcer [Oriented To Time, Place, And Person] : oriented to person, place, and time [FreeTextEntry1] : No edema above the thigh\par Soft pitting edema up to the knee on the left leg\par Less right leg below the knee edema noted\par Small reticular veins to left medal thigh\par Palpable pedal pulses bilaterally

## 2023-04-24 NOTE — ASSESSMENT
[FreeTextEntry1] : Assessment:\par 1. Left greater than R LE swelling post fall in February\par 2. History Afib on Eliquis\par \par Plan:\par 1. Recommend to continue compression garments.\par     Encouraging use of compression stockings first thing in the morning.\par 2. Calf pump exercises.\par 3. Already on anticoagulation for Afib.\par     2 recent LE venous duplex negative for DVT.\par 4. Recommend pneumatic compression pump, can be purchased online.\par 5. Discussed that if the left leg remains swollen we can later consider an MR venogram abdomen and pelvis.\par 6. Recommend leg elevation above heart level.\par 7. Recommend walking as tolerated.\par 8. Follow-up as needed based on progress. Call with any questions anytime. \par \par Pneumatic pump shown to patient - he can use 1-2 x per day

## 2023-04-27 ENCOUNTER — APPOINTMENT (OUTPATIENT)
Dept: UROLOGY | Facility: CLINIC | Age: 83
End: 2023-04-27
Payer: MEDICARE

## 2023-04-27 DIAGNOSIS — N52.9 MALE ERECTILE DYSFUNCTION, UNSPECIFIED: ICD-10-CM

## 2023-04-27 DIAGNOSIS — R97.20 ELEVATED PROSTATE, SPECIFIC ANTIGEN [PSA]: ICD-10-CM

## 2023-04-27 PROCEDURE — 99214 OFFICE O/P EST MOD 30 MIN: CPT

## 2023-04-28 LAB
APPEARANCE: CLEAR
BACTERIA: NEGATIVE /HPF
BILIRUBIN URINE: NEGATIVE
BLOOD URINE: NEGATIVE
CAST: 0 /LPF
COLOR: NORMAL
EPITHELIAL CELLS: 1 /HPF
GLUCOSE QUALITATIVE U: NEGATIVE MG/DL
KETONES URINE: ABNORMAL MG/DL
LEUKOCYTE ESTERASE URINE: NEGATIVE
MICROSCOPIC-UA: NORMAL
NITRITE URINE: NEGATIVE
PH URINE: 5.5
PROTEIN URINE: NEGATIVE MG/DL
PSA FREE FLD-MCNC: 25 %
PSA FREE SERPL-MCNC: 1.07 NG/ML
PSA SERPL-MCNC: 4.29 NG/ML
RED BLOOD CELLS URINE: 1 /HPF
SPECIFIC GRAVITY URINE: 1.02
UROBILINOGEN URINE: 0.2 MG/DL
WHITE BLOOD CELLS URINE: 1 /HPF

## 2023-05-03 ENCOUNTER — APPOINTMENT (OUTPATIENT)
Dept: VASCULAR SURGERY | Facility: CLINIC | Age: 83
End: 2023-05-03

## 2023-05-15 ENCOUNTER — APPOINTMENT (OUTPATIENT)
Dept: VASCULAR SURGERY | Facility: CLINIC | Age: 83
End: 2023-05-15

## 2023-06-19 ENCOUNTER — RX ONLY (RX ONLY)
Age: 83
End: 2023-06-19

## 2023-06-19 ENCOUNTER — OFFICE (OUTPATIENT)
Dept: URBAN - METROPOLITAN AREA CLINIC 102 | Facility: CLINIC | Age: 83
Setting detail: OPHTHALMOLOGY
End: 2023-06-19
Payer: MEDICARE

## 2023-06-19 VITALS — HEIGHT: 60 IN

## 2023-06-19 DIAGNOSIS — H40.013: ICD-10-CM

## 2023-06-19 DIAGNOSIS — H43.813: ICD-10-CM

## 2023-06-19 DIAGNOSIS — H16.223: ICD-10-CM

## 2023-06-19 DIAGNOSIS — H01.005: ICD-10-CM

## 2023-06-19 DIAGNOSIS — Q14.1: ICD-10-CM

## 2023-06-19 DIAGNOSIS — H01.002: ICD-10-CM

## 2023-06-19 DIAGNOSIS — H26.493: ICD-10-CM

## 2023-06-19 DIAGNOSIS — Z96.1: ICD-10-CM

## 2023-06-19 PROCEDURE — 92133 CPTRZD OPH DX IMG PST SGM ON: CPT | Performed by: OPHTHALMOLOGY

## 2023-06-19 PROCEDURE — 92014 COMPRE OPH EXAM EST PT 1/>: CPT | Performed by: OPHTHALMOLOGY

## 2023-06-19 ASSESSMENT — AXIALLENGTH_DERIVED
OD_AL: 22.9031
OS_AL: 23.0668
OD_AL: 22.9953
OS_AL: 23.2074

## 2023-06-19 ASSESSMENT — LID EXAM ASSESSMENTS
OS_BLEPHARITIS: LLL T
OD_BLEPHARITIS: RLL T

## 2023-06-19 ASSESSMENT — REFRACTION_MANIFEST
OD_SPHERE: -0.75
OS_VA1: 20/40+
OS_SPHERE: -0.50
OS_AXIS: 058
OD_VA1: 20/40-1
OS_CYLINDER: -1.00
OD_CYLINDER: -1.25
OD_AXIS: 169

## 2023-06-19 ASSESSMENT — SPHEQUIV_DERIVED
OD_SPHEQUIV: -1.125
OS_SPHEQUIV: -1
OD_SPHEQUIV: -1.375
OS_SPHEQUIV: -0.625

## 2023-06-19 ASSESSMENT — CONFRONTATIONAL VISUAL FIELD TEST (CVF)
OD_FINDINGS: FULL
OS_FINDINGS: FULL

## 2023-06-19 ASSESSMENT — REFRACTION_AUTOREFRACTION
OD_AXIS: 017
OS_CYLINDER: -1.25
OD_CYLINDER: -1.75
OD_SPHERE: -0.25
OS_AXIS: 068
OS_SPHERE: 0.00

## 2023-06-19 ASSESSMENT — KERATOMETRY
OD_K1POWER_DIOPTERS: 46.25
OS_K1POWER_DIOPTERS: 45.00
OS_K2POWER_DIOPTERS: 46.25
METHOD_AUTO_MANUAL: AUTO
OD_AXISANGLE_DEGREES: 123
OS_AXISANGLE_DEGREES: 017
OD_K2POWER_DIOPTERS: 47.00

## 2023-06-19 ASSESSMENT — TONOMETRY
OD_IOP_MMHG: 20
OS_IOP_MMHG: 15
OD_IOP_MMHG: 16
OS_IOP_MMHG: 14

## 2023-06-19 ASSESSMENT — VISUAL ACUITY
OD_BCVA: 20/25-1
OS_BCVA: 20/20

## 2023-06-19 ASSESSMENT — DECREASING TEAR LAKE - SEVERITY SCORE
OD_DEC_TEARLAKE: 2+
OS_DEC_TEARLAKE: 2+

## 2023-06-21 ENCOUNTER — APPOINTMENT (OUTPATIENT)
Dept: VASCULAR SURGERY | Facility: CLINIC | Age: 83
End: 2023-06-21
Payer: MEDICARE

## 2023-06-21 PROCEDURE — 93971 EXTREMITY STUDY: CPT

## 2023-06-21 PROCEDURE — 99203 OFFICE O/P NEW LOW 30 MIN: CPT

## 2023-06-21 NOTE — ASSESSMENT
[FreeTextEntry1] : 81 yo M with severe chronic LLE swelling. Compliant with compression therapy and lymphedema pumps. Active. Works as an  full time. Unable to wear shoes due to swelling.  Chronic LLE swelling present for many years. Now worsen after fall in Feb. Likely secondary to lymphedema in the setting of having L knee and hip arthritis.

## 2023-06-21 NOTE — HISTORY OF PRESENT ILLNESS
[FreeTextEntry1] : 82 year old Male with PMHX of Afib, with longstanding history of swelling to BLE prior to recent injury.\par \par Pt reports he fell back in February in [place]. XRay performed was negative for fracture and due to swelling a LE venous duplex was performed and was negative for DVT at [facility] in [place]. He has continued to experience significant swelling in LLE and was seen at [facility] and vascular surgeon here with similar findings. He has significant L Hip and L knee pain and is contemplating surgical intervention in the near future. He has a h/o varicose veins and has been wearing compression stockings for > 30 years. This does provide some relief, however he has found increasing discoloration of LLE skin and has a difficult time wearing his usual dress shoes. 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 \par

## 2023-06-21 NOTE — PHYSICAL EXAM
[2+] : left 2+ [Ankle Swelling (On Exam)] : present [Ankle Swelling Bilaterally] : severe [Varicose Veins Of Lower Extremities] : bilaterally [Ankle Swelling On The Left] : moderate [Normal Breath Sounds] : Normal breath sounds [Normal Heart Sounds] : normal heart sounds [JVD] : no jugular venous distention  [Carotid Bruits] : no carotid bruits [] : not present [de-identified] : BRINA SINGER in NAD [de-identified] : b inner malleolar 2- 3 mm VV. clusters of VV over B thighs

## 2023-07-07 ENCOUNTER — APPOINTMENT (OUTPATIENT)
Dept: VASCULAR SURGERY | Facility: CLINIC | Age: 83
End: 2023-07-07
Payer: MEDICARE

## 2023-07-07 VITALS
OXYGEN SATURATION: 96 % | WEIGHT: 191 LBS | TEMPERATURE: 97.6 F | BODY MASS INDEX: 25.87 KG/M2 | SYSTOLIC BLOOD PRESSURE: 130 MMHG | RESPIRATION RATE: 16 BRPM | HEART RATE: 85 BPM | HEIGHT: 72 IN | DIASTOLIC BLOOD PRESSURE: 74 MMHG

## 2023-07-07 DIAGNOSIS — M79.89 OTHER SPECIFIED SOFT TISSUE DISORDERS: ICD-10-CM

## 2023-07-07 PROCEDURE — 36482Z ENDOVEN THER CHEM ADHES 1ST: CUSTOM | Mod: LT

## 2023-07-07 NOTE — REASON FOR VISIT
[Procedure: _________] : a [unfilled] procedure visit [FreeTextEntry1] : Left great saphenous vein chemical ablation with venaseal

## 2023-07-07 NOTE — PROCEDURE
[FreeTextEntry1] : Left great saphenous vein chemical ablation with venaseal [FreeTextEntry2] : venous insufficiency [FreeTextEntry3] : Indication: Left lower extremity varicose veins with leg pain, leg swelling, and leg cramping.  Venous insufficiency/ reflux.\par \par Procedure:  Endovenous ablation of the left great saphenous vein with VenaSeal™ Closure System\par 	\par Mr. LINWOOD LEAVITT is a 82 year old M with a history of left lower extremity varicose veins and venous insufficiency previously seen in the office.  Ultrasound examination demonstrated venous insufficiency. A trial of compression stockings, exercise, elevation, and pain medication was attempted without relief and definitive treatment with radiofrequency ablation was offered. \par \par I have discussed the risks of the procedure at length with the patient. The risks discussed were inclusive of but not limited to infection, irritation at the site of infiltration of local anesthesia, and rare risk of deep venous thrombosis and pulmonary emboli. The patient agrees to proceed with the procedure. \par \par The patient was escorted into the procedure room and a time out called.\par \par The entire limb was prepped and draped in sterile fashion. Ultrasound guidance was used to localize the access site. 1% lidocaine was injected as a local anesthetic in the subcutaneous tissues at the target location in the leg. Using ultrasound guidance, access was gained at this location with the 19-gauge thin-walled access needle and followed by introduction of a short guidewire, location confirmed with ultrasound. A small, 3 mm incision was made at the access site to allow for introduction and placement of the 7 Fr x7cm introducer/dilator. The dilator and guidewire were removed. The 0.035 guidewire from the Venaseal kit was then introduced and positioned at the left saphenofemoral junction using ultrasound guidance. The 80 cm 7 Fr introducer sheath/dilator was positioned 5cm from the saphenofemoral junction. The guidewire and dilator were removed, and the remaining sheath was flushed with sterile saline, with the syringe remaining in place prior to the next steps. \par \par The cyanoacrylate adhesive was precisely primed into the 5F delivery catheter, and this catheter/syringe combination was attached within the dispenser gun. This “assembly” was introduced through the 7F sheath and positioned 5 cm caudal of the saphenofemoral junction under ultrasound guidance. The steps from the IFU were followed for dispensing amounts, locations and compression times, e.g 2 aliquots proximally with 3 minutes of compression, and 1 aliquot every 3cm distally with 30 sec of compression along the course of the vessel. Following the last injection and compression sequence, the catheter and introducer sheath were pulled out from the access site. Hemostasis was achieved with manual compression and an adhesive bandage was applied to the incision. Ultrasound confirmed complete coaptation and closure of the treated segments of the left GSV, and the absence of any DVT at the saphenofemoral junction. \par \par Treatment length of the vein 51 cm.\par \par The drapes were removed, and the patient cleaned and prepared for discharge. Patient tolerated procedure well. Patient was given post-procedure instructions and follow up appointment was scheduled.  Post op ultrasound is scheduled.\par \par \par

## 2023-07-12 ENCOUNTER — APPOINTMENT (OUTPATIENT)
Dept: VASCULAR SURGERY | Facility: CLINIC | Age: 83
End: 2023-07-12

## 2023-07-17 ENCOUNTER — APPOINTMENT (OUTPATIENT)
Dept: VASCULAR SURGERY | Facility: CLINIC | Age: 83
End: 2023-07-17
Payer: MEDICARE

## 2023-07-17 VITALS
BODY MASS INDEX: 25.87 KG/M2 | HEART RATE: 92 BPM | DIASTOLIC BLOOD PRESSURE: 97 MMHG | HEIGHT: 72 IN | WEIGHT: 191 LBS | OXYGEN SATURATION: 98 % | RESPIRATION RATE: 16 BRPM | SYSTOLIC BLOOD PRESSURE: 153 MMHG

## 2023-07-17 PROCEDURE — 99212 OFFICE O/P EST SF 10 MIN: CPT

## 2023-07-17 PROCEDURE — 93971 EXTREMITY STUDY: CPT

## 2023-07-17 NOTE — DISCUSSION/SUMMARY
[FreeTextEntry1] : Post-procedure venous duplex demonstrates successful closure of the left great saphenous vein consistent with post ablation treatment. There is no evidence of DVT.\par

## 2023-07-17 NOTE — PHYSICAL EXAM
[Normal Breath Sounds] : Normal breath sounds [Normal Heart Sounds] : normal heart sounds [2+] : left 2+ [Ankle Swelling (On Exam)] : present [Ankle Swelling Bilaterally] : severe [Varicose Veins Of Lower Extremities] : bilaterally [Ankle Swelling On The Left] : moderate [JVD] : no jugular venous distention  [Carotid Bruits] : no carotid bruits [] : not present [de-identified] : BRINA SINGER in NAD [FreeTextEntry1] : incision site C/D/I.  Mild hyperpigmentation at medial thigh.  No pain to palpation. [de-identified] : b inner malleolar 2- 3 mm VV. clusters of VV over B thighs

## 2023-07-17 NOTE — REASON FOR VISIT
[de-identified] : Left great saphenous vein chemical ablation with venaseal [de-identified] : 7/7/2023 [de-identified] : Mr. LINWOOD LEAVITT is a 82 year who presents to the office for follow-up evaluation of his varicose veins and venous insufficiency. Mr. LEAVITT presents for post procedure evaluation. He is s/p endovenous ablation of his left great saphenous vein on 7/7/2023. Patient is doing well. Mr. LEAVITT reports his leg heaviness and discomfort has significantly improved.  He does still have swelling of his left leg. No fever or chills. No numbness, redness or bruising of the legs. Mr. LEAVITT has been compliant with wearing compression stockings. \par \par Post procedure venous duplex demonstrated successful closure of the left great saphenous vein without evidence of DVT.\par

## 2023-09-11 ENCOUNTER — APPOINTMENT (OUTPATIENT)
Dept: OTOLARYNGOLOGY | Facility: CLINIC | Age: 83
End: 2023-09-11
Payer: MEDICARE

## 2023-09-11 VITALS — WEIGHT: 193 LBS | BODY MASS INDEX: 26.43 KG/M2 | HEIGHT: 71.75 IN

## 2023-09-11 VITALS — SYSTOLIC BLOOD PRESSURE: 141 MMHG | DIASTOLIC BLOOD PRESSURE: 89 MMHG | HEART RATE: 67 BPM

## 2023-09-11 DIAGNOSIS — H61.23 IMPACTED CERUMEN, BILATERAL: ICD-10-CM

## 2023-09-11 DIAGNOSIS — H90.3 SENSORINEURAL HEARING LOSS, BILATERAL: ICD-10-CM

## 2023-09-11 PROCEDURE — 69210 REMOVE IMPACTED EAR WAX UNI: CPT

## 2023-09-11 PROCEDURE — 92557 COMPREHENSIVE HEARING TEST: CPT

## 2023-09-11 PROCEDURE — 92567 TYMPANOMETRY: CPT

## 2023-09-11 PROCEDURE — 99213 OFFICE O/P EST LOW 20 MIN: CPT | Mod: 25

## 2023-09-11 RX ORDER — EVOLOCUMAB 140 MG/ML
140 INJECTION, SOLUTION SUBCUTANEOUS
Qty: 2 | Refills: 0 | Status: COMPLETED | COMMUNITY
Start: 2023-08-02

## 2023-09-13 ENCOUNTER — OUTPATIENT (OUTPATIENT)
Dept: OUTPATIENT SERVICES | Facility: HOSPITAL | Age: 83
LOS: 1 days | End: 2023-09-13
Payer: MEDICARE

## 2023-09-13 ENCOUNTER — APPOINTMENT (OUTPATIENT)
Dept: MRI IMAGING | Facility: CLINIC | Age: 83
End: 2023-09-13
Payer: MEDICARE

## 2023-09-13 DIAGNOSIS — H90.3 SENSORINEURAL HEARING LOSS, BILATERAL: ICD-10-CM

## 2023-09-13 DIAGNOSIS — Z41.9 ENCOUNTER FOR PROCEDURE FOR PURPOSES OTHER THAN REMEDYING HEALTH STATE, UNSPECIFIED: Chronic | ICD-10-CM

## 2023-09-13 DIAGNOSIS — Z98.89 OTHER SPECIFIED POSTPROCEDURAL STATES: Chronic | ICD-10-CM

## 2023-09-13 PROCEDURE — 70551 MRI BRAIN STEM W/O DYE: CPT | Mod: 26,MH

## 2023-09-13 PROCEDURE — 70551 MRI BRAIN STEM W/O DYE: CPT

## 2023-09-16 PROBLEM — H61.23 BILATERAL IMPACTED CERUMEN: Status: ACTIVE | Noted: 2020-02-07

## 2023-09-19 ENCOUNTER — APPOINTMENT (OUTPATIENT)
Dept: PHARMACY | Facility: CLINIC | Age: 83
End: 2023-09-19
Payer: SELF-PAY

## 2023-09-19 PROCEDURE — V5010 ASSESSMENT FOR HEARING AID: CPT

## 2023-09-21 ENCOUNTER — APPOINTMENT (OUTPATIENT)
Dept: VASCULAR SURGERY | Facility: CLINIC | Age: 83
End: 2023-09-21
Payer: MEDICARE

## 2023-09-21 VITALS
RESPIRATION RATE: 16 BRPM | SYSTOLIC BLOOD PRESSURE: 128 MMHG | HEART RATE: 92 BPM | DIASTOLIC BLOOD PRESSURE: 86 MMHG | OXYGEN SATURATION: 98 % | BODY MASS INDEX: 26.43 KG/M2 | WEIGHT: 193 LBS | HEIGHT: 71.5 IN

## 2023-09-21 PROCEDURE — 36466Z: CUSTOM | Mod: LT

## 2023-09-27 ENCOUNTER — APPOINTMENT (OUTPATIENT)
Dept: VASCULAR SURGERY | Facility: CLINIC | Age: 83
End: 2023-09-27
Payer: MEDICARE

## 2023-09-27 DIAGNOSIS — I87.2 VENOUS INSUFFICIENCY (CHRONIC) (PERIPHERAL): ICD-10-CM

## 2023-09-27 PROCEDURE — 99212 OFFICE O/P EST SF 10 MIN: CPT

## 2023-09-27 PROCEDURE — 93971 EXTREMITY STUDY: CPT

## 2023-10-09 ENCOUNTER — APPOINTMENT (OUTPATIENT)
Dept: VASCULAR SURGERY | Facility: CLINIC | Age: 83
End: 2023-10-09

## 2023-10-13 ENCOUNTER — APPOINTMENT (OUTPATIENT)
Dept: VASCULAR SURGERY | Facility: CLINIC | Age: 83
End: 2023-10-13
Payer: MEDICARE

## 2023-10-13 VITALS
WEIGHT: 191 LBS | HEART RATE: 52 BPM | HEIGHT: 71.5 IN | RESPIRATION RATE: 14 BRPM | OXYGEN SATURATION: 98 % | SYSTOLIC BLOOD PRESSURE: 130 MMHG | BODY MASS INDEX: 26.16 KG/M2 | DIASTOLIC BLOOD PRESSURE: 78 MMHG

## 2023-10-13 DIAGNOSIS — I83.899 VARICOSE VEINS OF UNSPECIFIED LOWER EXTREMITY WITH OTHER COMPLICATIONS: ICD-10-CM

## 2023-10-13 DIAGNOSIS — I89.0 LYMPHEDEMA, NOT ELSEWHERE CLASSIFIED: ICD-10-CM

## 2023-10-13 PROCEDURE — 99213 OFFICE O/P EST LOW 20 MIN: CPT

## 2023-11-09 ENCOUNTER — APPOINTMENT (OUTPATIENT)
Dept: PHARMACY | Facility: CLINIC | Age: 83
End: 2023-11-09
Payer: SELF-PAY

## 2023-11-09 PROCEDURE — V5261C: CUSTOM

## 2023-11-16 ENCOUNTER — APPOINTMENT (OUTPATIENT)
Dept: PHARMACY | Facility: CLINIC | Age: 83
End: 2023-11-16
Payer: SELF-PAY

## 2023-11-16 PROCEDURE — V5299A: CUSTOM

## 2023-11-30 ENCOUNTER — APPOINTMENT (OUTPATIENT)
Dept: PHARMACY | Facility: CLINIC | Age: 83
End: 2023-11-30
Payer: SELF-PAY

## 2023-11-30 PROCEDURE — V5299A: CUSTOM

## 2023-12-05 ENCOUNTER — OFFICE (OUTPATIENT)
Dept: URBAN - METROPOLITAN AREA CLINIC 102 | Facility: CLINIC | Age: 83
Setting detail: OPHTHALMOLOGY
End: 2023-12-05
Payer: MEDICARE

## 2023-12-05 DIAGNOSIS — H16.223: ICD-10-CM

## 2023-12-05 DIAGNOSIS — Z96.1: ICD-10-CM

## 2023-12-05 DIAGNOSIS — H43.813: ICD-10-CM

## 2023-12-05 DIAGNOSIS — H01.005: ICD-10-CM

## 2023-12-05 DIAGNOSIS — H40.013: ICD-10-CM

## 2023-12-05 DIAGNOSIS — H01.002: ICD-10-CM

## 2023-12-05 DIAGNOSIS — Q14.1: ICD-10-CM

## 2023-12-05 DIAGNOSIS — H26.493: ICD-10-CM

## 2023-12-05 PROCEDURE — 92014 COMPRE OPH EXAM EST PT 1/>: CPT | Performed by: OPHTHALMOLOGY

## 2023-12-05 PROCEDURE — 92083 EXTENDED VISUAL FIELD XM: CPT | Performed by: OPHTHALMOLOGY

## 2023-12-05 ASSESSMENT — LID EXAM ASSESSMENTS
OS_BLEPHARITIS: LLL T
OD_BLEPHARITIS: RLL T

## 2023-12-05 ASSESSMENT — REFRACTION_AUTOREFRACTION
OS_CYLINDER: -1.50
OD_AXIS: 023
OS_SPHERE: +0.25
OD_SPHERE: 0.00
OD_CYLINDER: -1.25
OS_AXIS: 069

## 2023-12-05 ASSESSMENT — REFRACTION_MANIFEST
OD_VA1: 20/40-1
OD_AXIS: 169
OS_CYLINDER: -1.00
OD_CYLINDER: -1.25
OS_AXIS: 058
OS_VA1: 20/40+
OS_SPHERE: -0.50
OD_SPHERE: -0.75

## 2023-12-05 ASSESSMENT — SPHEQUIV_DERIVED
OS_SPHEQUIV: -1
OD_SPHEQUIV: -1.375
OD_SPHEQUIV: -0.625
OS_SPHEQUIV: -0.5

## 2023-12-05 ASSESSMENT — DECREASING TEAR LAKE - SEVERITY SCORE
OS_DEC_TEARLAKE: 2+
OD_DEC_TEARLAKE: 2+

## 2023-12-05 ASSESSMENT — CONFRONTATIONAL VISUAL FIELD TEST (CVF)
OD_FINDINGS: FULL
OS_FINDINGS: FULL

## 2024-01-17 ENCOUNTER — NON-APPOINTMENT (OUTPATIENT)
Age: 84
End: 2024-01-17

## 2024-03-11 ENCOUNTER — OUTPATIENT (OUTPATIENT)
Dept: OUTPATIENT SERVICES | Facility: HOSPITAL | Age: 84
LOS: 1 days | End: 2024-03-11
Payer: MEDICARE

## 2024-03-11 ENCOUNTER — APPOINTMENT (OUTPATIENT)
Dept: CT IMAGING | Facility: CLINIC | Age: 84
End: 2024-03-11
Payer: MEDICARE

## 2024-03-11 DIAGNOSIS — Z41.9 ENCOUNTER FOR PROCEDURE FOR PURPOSES OTHER THAN REMEDYING HEALTH STATE, UNSPECIFIED: Chronic | ICD-10-CM

## 2024-03-11 DIAGNOSIS — Z98.89 OTHER SPECIFIED POSTPROCEDURAL STATES: Chronic | ICD-10-CM

## 2024-03-11 DIAGNOSIS — N40.1 BENIGN PROSTATIC HYPERPLASIA WITH LOWER URINARY TRACT SYMPTOMS: ICD-10-CM

## 2024-03-11 DIAGNOSIS — Z98.49 CATARACT EXTRACTION STATUS, UNSPECIFIED EYE: Chronic | ICD-10-CM

## 2024-03-11 PROCEDURE — 74178 CT ABD&PLV WO CNTR FLWD CNTR: CPT | Mod: 26,MH

## 2024-03-11 PROCEDURE — 74178 CT ABD&PLV WO CNTR FLWD CNTR: CPT

## 2024-04-22 ENCOUNTER — APPOINTMENT (OUTPATIENT)
Dept: PHARMACY | Facility: CLINIC | Age: 84
End: 2024-04-22
Payer: SELF-PAY

## 2024-04-22 PROCEDURE — V5299A: CUSTOM

## 2024-05-01 ENCOUNTER — APPOINTMENT (OUTPATIENT)
Dept: UROLOGY | Facility: CLINIC | Age: 84
End: 2024-05-01
Payer: MEDICARE

## 2024-05-01 DIAGNOSIS — N13.8 BENIGN PROSTATIC HYPERPLASIA WITH LOWER URINARY TRACT SYMPMS: ICD-10-CM

## 2024-05-01 DIAGNOSIS — N40.1 BENIGN PROSTATIC HYPERPLASIA WITH LOWER URINARY TRACT SYMPMS: ICD-10-CM

## 2024-05-01 PROCEDURE — G2211 COMPLEX E/M VISIT ADD ON: CPT

## 2024-05-01 PROCEDURE — 99214 OFFICE O/P EST MOD 30 MIN: CPT

## 2024-05-02 LAB
APPEARANCE: CLEAR
BACTERIA UR CULT: NORMAL
BACTERIA: NEGATIVE /HPF
BILIRUBIN URINE: ABNORMAL
BLOOD URINE: NEGATIVE
CAST: 0 /LPF
COLOR: NORMAL
EPITHELIAL CELLS: 2 /HPF
GLUCOSE QUALITATIVE U: NEGATIVE MG/DL
KETONES URINE: ABNORMAL MG/DL
LEUKOCYTE ESTERASE URINE: ABNORMAL
MICROSCOPIC-UA: NORMAL
NITRITE URINE: NEGATIVE
PH URINE: 5.5
PROTEIN URINE: NORMAL MG/DL
PSA FREE FLD-MCNC: 20 %
PSA FREE SERPL-MCNC: 1.8 NG/ML
PSA SERPL-MCNC: 9.18 NG/ML
RED BLOOD CELLS URINE: 1 /HPF
SPECIFIC GRAVITY URINE: 1.03
URINE CYTOLOGY: NORMAL
UROBILINOGEN URINE: 1 MG/DL
WHITE BLOOD CELLS URINE: 4 /HPF

## 2024-07-03 ENCOUNTER — APPOINTMENT (OUTPATIENT)
Dept: PHARMACY | Facility: CLINIC | Age: 84
End: 2024-07-03
Payer: SELF-PAY

## 2024-07-03 PROCEDURE — V5299A: CUSTOM

## 2024-07-08 ENCOUNTER — NON-APPOINTMENT (OUTPATIENT)
Age: 84
End: 2024-07-08

## 2024-07-18 ENCOUNTER — APPOINTMENT (OUTPATIENT)
Dept: VASCULAR SURGERY | Facility: CLINIC | Age: 84
End: 2024-07-18
Payer: MEDICARE

## 2024-07-18 VITALS — OXYGEN SATURATION: 96 % | SYSTOLIC BLOOD PRESSURE: 125 MMHG | HEART RATE: 90 BPM | DIASTOLIC BLOOD PRESSURE: 80 MMHG

## 2024-07-18 DIAGNOSIS — I83.899 VARICOSE VEINS OF UNSPECIFIED LOWER EXTREMITY WITH OTHER COMPLICATIONS: ICD-10-CM

## 2024-07-18 DIAGNOSIS — I89.0 LYMPHEDEMA, NOT ELSEWHERE CLASSIFIED: ICD-10-CM

## 2024-07-18 DIAGNOSIS — I87.2 VENOUS INSUFFICIENCY (CHRONIC) (PERIPHERAL): ICD-10-CM

## 2024-07-18 PROCEDURE — 99213 OFFICE O/P EST LOW 20 MIN: CPT

## 2024-07-18 PROCEDURE — 93970 EXTREMITY STUDY: CPT

## 2024-07-22 ENCOUNTER — NON-APPOINTMENT (OUTPATIENT)
Age: 84
End: 2024-07-22

## 2024-07-31 ENCOUNTER — APPOINTMENT (OUTPATIENT)
Dept: CARDIOLOGY | Facility: CLINIC | Age: 84
End: 2024-07-31
Payer: MEDICARE

## 2024-07-31 VITALS
OXYGEN SATURATION: 94 % | HEART RATE: 100 BPM | WEIGHT: 192 LBS | DIASTOLIC BLOOD PRESSURE: 79 MMHG | SYSTOLIC BLOOD PRESSURE: 132 MMHG | BODY MASS INDEX: 26.29 KG/M2 | HEIGHT: 71.5 IN

## 2024-07-31 DIAGNOSIS — Z87.891 PERSONAL HISTORY OF NICOTINE DEPENDENCE: ICD-10-CM

## 2024-07-31 DIAGNOSIS — I89.0 LYMPHEDEMA, NOT ELSEWHERE CLASSIFIED: ICD-10-CM

## 2024-07-31 DIAGNOSIS — Z00.00 ENCOUNTER FOR GENERAL ADULT MEDICAL EXAMINATION W/OUT ABNORMAL FINDINGS: ICD-10-CM

## 2024-07-31 PROCEDURE — 99214 OFFICE O/P EST MOD 30 MIN: CPT

## 2024-07-31 PROCEDURE — G2211 COMPLEX E/M VISIT ADD ON: CPT

## 2024-07-31 NOTE — PHYSICAL EXAM
[Respiration, Rhythm And Depth] : normal respiratory rhythm and effort [Auscultation Breath Sounds / Voice Sounds] : lungs were clear to auscultation bilaterally [Heart Rate And Rhythm] : heart rate and rhythm were normal [Heart Sounds] : normal S1 and S2 [Bowel Sounds] : normal bowel sounds [Abdomen Soft] : soft [Abdomen Tenderness] : non-tender [Abnormal Walk] : normal gait [] : no ischemic changes [No Skin Ulcers] : no skin ulcer [Oriented To Time, Place, And Person] : oriented to person, place, and time

## 2024-08-01 LAB
ALBUMIN SERPL ELPH-MCNC: 4.4 G/DL
ALP BLD-CCNC: 54 U/L
ALT SERPL-CCNC: 32 U/L
ANION GAP SERPL CALC-SCNC: 12 MMOL/L
AST SERPL-CCNC: 34 U/L
BILIRUB SERPL-MCNC: 0.6 MG/DL
BUN SERPL-MCNC: 25 MG/DL
CALCIUM SERPL-MCNC: 9.2 MG/DL
CHLORIDE SERPL-SCNC: 104 MMOL/L
CHOLEST SERPL-MCNC: 138 MG/DL
CO2 SERPL-SCNC: 24 MMOL/L
CREAT SERPL-MCNC: 1.02 MG/DL
EGFR: 74 ML/MIN/1.73M2
ESTIMATED AVERAGE GLUCOSE: 117 MG/DL
GLUCOSE SERPL-MCNC: 103 MG/DL
HBA1C MFR BLD HPLC: 5.7 %
HCT VFR BLD CALC: 40.3 %
HDLC SERPL-MCNC: 52 MG/DL
HGB BLD-MCNC: 13.4 G/DL
LDLC SERPL CALC-MCNC: 68 MG/DL
MCHC RBC-ENTMCNC: 31.9 PG
MCHC RBC-ENTMCNC: 33.3 GM/DL
MCV RBC AUTO: 96 FL
NONHDLC SERPL-MCNC: 86 MG/DL
NT-PROBNP SERPL-MCNC: 903 PG/ML
PLATELET # BLD AUTO: 215 K/UL
POTASSIUM SERPL-SCNC: 4.3 MMOL/L
PROT SERPL-MCNC: 6.5 G/DL
PSA FREE FLD-MCNC: 15 %
PSA FREE SERPL-MCNC: 1.16 NG/ML
PSA SERPL-MCNC: 7.89 NG/ML
RBC # BLD: 4.2 M/UL
RBC # FLD: 12.5 %
SODIUM SERPL-SCNC: 139 MMOL/L
TRIGL SERPL-MCNC: 98 MG/DL
TSH SERPL-ACNC: 0.6 UIU/ML
WBC # FLD AUTO: 5.2 K/UL

## 2024-09-23 ENCOUNTER — APPOINTMENT (OUTPATIENT)
Dept: PHARMACY | Facility: CLINIC | Age: 84
End: 2024-09-23
Payer: SELF-PAY

## 2024-09-23 PROCEDURE — V5299A: CUSTOM

## 2024-09-23 NOTE — HISTORY OF PRESENT ILLNESS
[FreeTextEntry1] : 83 year old male, seen for hearing aid problem. Wears AU [employer] Real 2 hearing aids u/w until 2026. [FreeTextEntry8] : Patient seen with complaint that retention tail had flipped. Additionally requested more gain on hearing aids.

## 2024-09-23 NOTE — ASSESSMENT
[FreeTextEntry1] : Thoroughly cleaned and checked hearing aids. Listening check revealed hearing aids to be in good working condition. Fixed retention tail. Updated firmware, bilaterally. Increased global gain 2 steps. Patient comfortable in the office.   Otoscopy revealed excessive cerumen. Recommended ENT follow up and cerumen removal. Recommended HAC following updated audio. Patient happy with today's services.   REC: HAC following updated audio.

## 2024-09-30 ENCOUNTER — APPOINTMENT (OUTPATIENT)
Dept: OTOLARYNGOLOGY | Facility: CLINIC | Age: 84
End: 2024-09-30

## 2024-09-30 VITALS
SYSTOLIC BLOOD PRESSURE: 163 MMHG | WEIGHT: 192 LBS | BODY MASS INDEX: 26.29 KG/M2 | HEIGHT: 71.5 IN | HEART RATE: 102 BPM | DIASTOLIC BLOOD PRESSURE: 88 MMHG

## 2024-09-30 DIAGNOSIS — H61.20 IMPACTED CERUMEN, UNSPECIFIED EAR: ICD-10-CM

## 2024-09-30 DIAGNOSIS — H90.3 SENSORINEURAL HEARING LOSS, BILATERAL: ICD-10-CM

## 2024-09-30 PROCEDURE — 69210 REMOVE IMPACTED EAR WAX UNI: CPT

## 2024-09-30 PROCEDURE — 99213 OFFICE O/P EST LOW 20 MIN: CPT | Mod: 25

## 2024-10-09 PROBLEM — H61.20 IMPACTED CERUMEN: Status: ACTIVE | Noted: 2024-10-09

## 2024-10-09 NOTE — HISTORY OF PRESENT ILLNESS
[de-identified] : 84 yo M with hx of asymmetric SNHL presents for cerumen removal. No noticeable change in hearing. No tinnitus, otalgia, otorrhea, ear infections, dizziness/vertigo or headaches related to hearing loss.

## 2024-10-09 NOTE — HISTORY OF PRESENT ILLNESS
[de-identified] : 82 yo M with hx of asymmetric SNHL presents for cerumen removal. No noticeable change in hearing. No tinnitus, otalgia, otorrhea, ear infections, dizziness/vertigo or headaches related to hearing loss.

## 2024-10-09 NOTE — ASSESSMENT
[FreeTextEntry1] : Cerumen removed AU without any complications. Follow up as planned with Dr. Ribeiro in Dec.

## 2024-10-22 ENCOUNTER — OFFICE (OUTPATIENT)
Dept: URBAN - METROPOLITAN AREA CLINIC 102 | Facility: CLINIC | Age: 84
Setting detail: OPHTHALMOLOGY
End: 2024-10-22
Payer: MEDICARE

## 2024-10-22 DIAGNOSIS — H40.013: ICD-10-CM

## 2024-10-22 DIAGNOSIS — H43.813: ICD-10-CM

## 2024-10-22 DIAGNOSIS — Z96.1: ICD-10-CM

## 2024-10-22 DIAGNOSIS — Q14.1: ICD-10-CM

## 2024-10-22 DIAGNOSIS — H26.493: ICD-10-CM

## 2024-10-22 DIAGNOSIS — H01.002: ICD-10-CM

## 2024-10-22 DIAGNOSIS — H01.005: ICD-10-CM

## 2024-10-22 DIAGNOSIS — H16.223: ICD-10-CM

## 2024-10-22 PROCEDURE — 92014 COMPRE OPH EXAM EST PT 1/>: CPT | Performed by: OPHTHALMOLOGY

## 2024-10-22 PROCEDURE — 92133 CPTRZD OPH DX IMG PST SGM ON: CPT | Performed by: OPHTHALMOLOGY

## 2024-10-22 ASSESSMENT — REFRACTION_AUTOREFRACTION
OD_AXIS: 31
OD_SPHERE: 0.00
OS_AXIS: 75
OD_CYLINDER: -1.00
OS_SPHERE: +0.75
OS_CYLINDER: -2.00

## 2024-10-22 ASSESSMENT — LID EXAM ASSESSMENTS
OS_BLEPHARITIS: LLL T
OD_BLEPHARITIS: RLL T

## 2024-10-22 ASSESSMENT — REFRACTION_MANIFEST
OD_AXIS: 169
OS_CYLINDER: -1.00
OD_VA1: 20/40-1
OD_CYLINDER: -1.25
OS_VA1: 20/40+
OS_AXIS: 058
OS_SPHERE: -0.50
OD_SPHERE: -0.75

## 2024-10-22 ASSESSMENT — KERATOMETRY
OD_AXISANGLE_DEGREES: 157
OS_K2POWER_DIOPTERS: 46.50
OD_K2POWER_DIOPTERS: 47.00
METHOD_AUTO_MANUAL: AUTO
OS_AXISANGLE_DEGREES: 009
OS_K1POWER_DIOPTERS: 44.75
OD_K1POWER_DIOPTERS: 45.75

## 2024-10-22 ASSESSMENT — CONFRONTATIONAL VISUAL FIELD TEST (CVF)
OD_FINDINGS: FULL
OS_FINDINGS: FULL

## 2024-10-22 ASSESSMENT — TONOMETRY
OD_IOP_MMHG: 16
OD_IOP_MMHG: 15
OS_IOP_MMHG: 18
OS_IOP_MMHG: 16

## 2024-10-22 ASSESSMENT — VISUAL ACUITY
OD_BCVA: 20/25
OS_BCVA: 20/20-1

## 2024-10-22 ASSESSMENT — DECREASING TEAR LAKE - SEVERITY SCORE
OS_DEC_TEARLAKE: 2+
OD_DEC_TEARLAKE: 2+

## 2024-10-25 ENCOUNTER — APPOINTMENT (OUTPATIENT)
Dept: PHARMACY | Facility: CLINIC | Age: 84
End: 2024-10-25
Payer: SELF-PAY

## 2024-10-25 PROCEDURE — V5299A: CUSTOM

## 2024-12-04 ENCOUNTER — APPOINTMENT (OUTPATIENT)
Dept: PHARMACY | Facility: CLINIC | Age: 84
End: 2024-12-04
Payer: SELF-PAY

## 2024-12-04 ENCOUNTER — APPOINTMENT (OUTPATIENT)
Dept: OTOLARYNGOLOGY | Facility: CLINIC | Age: 84
End: 2024-12-04

## 2024-12-04 PROCEDURE — V5299A: CUSTOM

## 2024-12-04 PROCEDURE — 69210 REMOVE IMPACTED EAR WAX UNI: CPT

## 2024-12-04 PROCEDURE — 99213 OFFICE O/P EST LOW 20 MIN: CPT | Mod: 25

## 2024-12-04 PROCEDURE — 92567 TYMPANOMETRY: CPT

## 2024-12-04 PROCEDURE — 92557 COMPREHENSIVE HEARING TEST: CPT

## 2025-04-29 ENCOUNTER — NON-APPOINTMENT (OUTPATIENT)
Age: 85
End: 2025-04-29

## 2025-04-30 ENCOUNTER — NON-APPOINTMENT (OUTPATIENT)
Age: 85
End: 2025-04-30

## 2025-05-01 ENCOUNTER — APPOINTMENT (OUTPATIENT)
Dept: UROLOGY | Facility: CLINIC | Age: 85
End: 2025-05-01
Payer: MEDICARE

## 2025-05-01 DIAGNOSIS — N40.1 BENIGN PROSTATIC HYPERPLASIA WITH LOWER URINARY TRACT SYMPMS: ICD-10-CM

## 2025-05-01 DIAGNOSIS — N13.8 BENIGN PROSTATIC HYPERPLASIA WITH LOWER URINARY TRACT SYMPMS: ICD-10-CM

## 2025-05-01 LAB
APPEARANCE: CLEAR
BACTERIA: NEGATIVE /HPF
BILIRUBIN URINE: NEGATIVE
BLOOD URINE: ABNORMAL
CAST: 0 /LPF
COLOR: NORMAL
EPITHELIAL CELLS: 2 /HPF
GLUCOSE QUALITATIVE U: NEGATIVE MG/DL
KETONES URINE: NEGATIVE MG/DL
LEUKOCYTE ESTERASE URINE: ABNORMAL
MICROSCOPIC-UA: NORMAL
NITRITE URINE: NEGATIVE
PH URINE: 6
PROTEIN URINE: NEGATIVE MG/DL
PSA FREE FLD-MCNC: 20 %
PSA FREE SERPL-MCNC: 1.19 NG/ML
PSA SERPL-MCNC: 6.04 NG/ML
RED BLOOD CELLS URINE: 3 /HPF
SPECIFIC GRAVITY URINE: 1.02
UROBILINOGEN URINE: 0.2 MG/DL
WHITE BLOOD CELLS URINE: 2 /HPF

## 2025-05-01 PROCEDURE — 99214 OFFICE O/P EST MOD 30 MIN: CPT

## 2025-05-01 PROCEDURE — G2211 COMPLEX E/M VISIT ADD ON: CPT

## 2025-05-02 LAB — URINE CYTOLOGY: NORMAL

## 2025-05-03 LAB — BACTERIA UR CULT: NORMAL

## 2025-05-15 ENCOUNTER — APPOINTMENT (OUTPATIENT)
Dept: OTOLARYNGOLOGY | Facility: CLINIC | Age: 85
End: 2025-05-15
Payer: MEDICARE

## 2025-05-15 VITALS
SYSTOLIC BLOOD PRESSURE: 159 MMHG | DIASTOLIC BLOOD PRESSURE: 86 MMHG | HEART RATE: 96 BPM | WEIGHT: 197 LBS | OXYGEN SATURATION: 97 % | BODY MASS INDEX: 26.98 KG/M2 | HEIGHT: 71.5 IN

## 2025-05-15 DIAGNOSIS — R04.0 EPISTAXIS: ICD-10-CM

## 2025-05-15 DIAGNOSIS — J38.3 OTHER DISEASES OF VOCAL CORDS: ICD-10-CM

## 2025-05-15 PROCEDURE — 99214 OFFICE O/P EST MOD 30 MIN: CPT | Mod: 25

## 2025-05-15 PROCEDURE — 31238 NSL/SINS NDSC SRG NSL HEMRRG: CPT | Mod: RT

## 2025-05-28 ENCOUNTER — APPOINTMENT (OUTPATIENT)
Dept: OTOLARYNGOLOGY | Facility: CLINIC | Age: 85
End: 2025-05-28
Payer: MEDICARE

## 2025-05-28 PROCEDURE — 99214 OFFICE O/P EST MOD 30 MIN: CPT | Mod: 25

## 2025-05-28 PROCEDURE — 31231 NASAL ENDOSCOPY DX: CPT

## 2025-06-05 ENCOUNTER — NON-APPOINTMENT (OUTPATIENT)
Age: 85
End: 2025-06-05

## 2025-06-05 ENCOUNTER — APPOINTMENT (OUTPATIENT)
Dept: NEUROSURGERY | Facility: CLINIC | Age: 85
End: 2025-06-05
Payer: MEDICARE

## 2025-06-05 VITALS
DIASTOLIC BLOOD PRESSURE: 73 MMHG | HEIGHT: 71.5 IN | WEIGHT: 192 LBS | SYSTOLIC BLOOD PRESSURE: 146 MMHG | HEART RATE: 91 BPM | OXYGEN SATURATION: 96 % | BODY MASS INDEX: 26.29 KG/M2

## 2025-06-05 DIAGNOSIS — R04.0 EPISTAXIS: ICD-10-CM

## 2025-06-05 PROCEDURE — 99205 OFFICE O/P NEW HI 60 MIN: CPT

## 2025-06-09 ENCOUNTER — APPOINTMENT (OUTPATIENT)
Dept: PHARMACY | Facility: CLINIC | Age: 85
End: 2025-06-09
Payer: SELF-PAY

## 2025-06-09 ENCOUNTER — APPOINTMENT (OUTPATIENT)
Dept: OTOLARYNGOLOGY | Facility: CLINIC | Age: 85
End: 2025-06-09
Payer: MEDICARE

## 2025-06-09 PROCEDURE — 99213 OFFICE O/P EST LOW 20 MIN: CPT | Mod: 25

## 2025-06-09 PROCEDURE — 69210 REMOVE IMPACTED EAR WAX UNI: CPT

## 2025-06-09 PROCEDURE — V5299A: CUSTOM

## 2025-06-09 PROCEDURE — V5267C: CUSTOM

## 2025-06-09 PROCEDURE — V5267D: CUSTOM

## 2025-06-11 ENCOUNTER — APPOINTMENT (OUTPATIENT)
Dept: OTOLARYNGOLOGY | Facility: CLINIC | Age: 85
End: 2025-06-11
Payer: MEDICARE

## 2025-06-11 VITALS
BODY MASS INDEX: 27 KG/M2 | SYSTOLIC BLOOD PRESSURE: 138 MMHG | HEIGHT: 71.3 IN | WEIGHT: 195 LBS | HEART RATE: 58 BPM | DIASTOLIC BLOOD PRESSURE: 90 MMHG

## 2025-06-11 PROCEDURE — 99213 OFFICE O/P EST LOW 20 MIN: CPT | Mod: 25

## 2025-06-11 PROCEDURE — 31231 NASAL ENDOSCOPY DX: CPT

## 2025-07-03 ENCOUNTER — OFFICE (OUTPATIENT)
Dept: URBAN - METROPOLITAN AREA CLINIC 102 | Facility: CLINIC | Age: 85
Setting detail: OPHTHALMOLOGY
End: 2025-07-03
Payer: MEDICARE

## 2025-07-03 DIAGNOSIS — H01.002: ICD-10-CM

## 2025-07-03 DIAGNOSIS — Q14.1: ICD-10-CM

## 2025-07-03 DIAGNOSIS — H01.005: ICD-10-CM

## 2025-07-03 DIAGNOSIS — H43.813: ICD-10-CM

## 2025-07-03 DIAGNOSIS — H16.223: ICD-10-CM

## 2025-07-03 DIAGNOSIS — H26.493: ICD-10-CM

## 2025-07-03 DIAGNOSIS — H40.013: ICD-10-CM

## 2025-07-03 DIAGNOSIS — Z96.1: ICD-10-CM

## 2025-07-03 PROCEDURE — 92083 EXTENDED VISUAL FIELD XM: CPT | Performed by: OPHTHALMOLOGY

## 2025-07-03 PROCEDURE — 92014 COMPRE OPH EXAM EST PT 1/>: CPT | Performed by: OPHTHALMOLOGY

## 2025-07-03 ASSESSMENT — TONOMETRY
OD_IOP_MMHG: 17
OS_IOP_MMHG: 17
OD_IOP_MMHG: 16
OS_IOP_MMHG: 15

## 2025-07-03 ASSESSMENT — VISUAL ACUITY
OS_BCVA: 20/30+2
OD_BCVA: 20/25

## 2025-07-03 ASSESSMENT — REFRACTION_MANIFEST
OD_CYLINDER: -1.25
OS_CYLINDER: -1.00
OD_SPHERE: -0.75
OD_AXIS: 169
OS_AXIS: 058
OS_VA1: 20/40+
OD_VA1: 20/40-1
OS_SPHERE: -0.50

## 2025-07-03 ASSESSMENT — LID EXAM ASSESSMENTS
OD_BLEPHARITIS: RLL T
OS_BLEPHARITIS: LLL T

## 2025-07-03 ASSESSMENT — KERATOMETRY
OS_AXISANGLE_DEGREES: 004
OD_K2POWER_DIOPTERS: 46.75
OS_K2POWER_DIOPTERS: 46.50
OD_AXISANGLE_DEGREES: 145
OS_K1POWER_DIOPTERS: 45.00
OD_K1POWER_DIOPTERS: 46.25

## 2025-07-03 ASSESSMENT — REFRACTION_AUTOREFRACTION
OD_AXIS: 037
OS_SPHERE: +0.75
OD_SPHERE: -0.25
OS_AXIS: 071
OD_CYLINDER: -0.75
OS_CYLINDER: -1.75

## 2025-07-03 ASSESSMENT — DECREASING TEAR LAKE - SEVERITY SCORE
OD_DEC_TEARLAKE: 2+
OS_DEC_TEARLAKE: 2+

## 2025-07-03 ASSESSMENT — CONFRONTATIONAL VISUAL FIELD TEST (CVF)
OD_FINDINGS: FULL
OS_FINDINGS: FULL

## 2025-07-17 ENCOUNTER — APPOINTMENT (OUTPATIENT)
Dept: OTOLARYNGOLOGY | Facility: CLINIC | Age: 85
End: 2025-07-17
Payer: MEDICARE

## 2025-07-17 VITALS
DIASTOLIC BLOOD PRESSURE: 72 MMHG | BODY MASS INDEX: 27.3 KG/M2 | WEIGHT: 195 LBS | SYSTOLIC BLOOD PRESSURE: 123 MMHG | HEIGHT: 71 IN

## 2025-07-17 PROCEDURE — 99213 OFFICE O/P EST LOW 20 MIN: CPT | Mod: 25

## 2025-07-17 PROCEDURE — 30905 CONTROL OF NOSEBLEED: CPT

## 2025-07-17 RX ORDER — OXYCODONE AND ACETAMINOPHEN 5; 325 MG/1; MG/1
5-325 TABLET ORAL
Qty: 24 | Refills: 0 | Status: ACTIVE | COMMUNITY
Start: 2025-07-17 | End: 1900-01-01

## 2025-07-17 RX ORDER — AMOXICILLIN 875 MG/1
875 TABLET, FILM COATED ORAL TWICE DAILY
Qty: 14 | Refills: 2 | Status: ACTIVE | COMMUNITY
Start: 2025-07-17 | End: 1900-01-01

## 2025-07-21 ENCOUNTER — APPOINTMENT (OUTPATIENT)
Dept: OTOLARYNGOLOGY | Facility: CLINIC | Age: 85
End: 2025-07-21
Payer: MEDICARE

## 2025-07-21 VITALS
SYSTOLIC BLOOD PRESSURE: 113 MMHG | HEIGHT: 71 IN | DIASTOLIC BLOOD PRESSURE: 74 MMHG | HEART RATE: 112 BPM | WEIGHT: 195 LBS | BODY MASS INDEX: 27.3 KG/M2

## 2025-07-21 DIAGNOSIS — R04.0 EPISTAXIS: ICD-10-CM

## 2025-07-21 DIAGNOSIS — D68.8 OTHER SPECIFIED COAGULATION DEFECTS: ICD-10-CM

## 2025-07-21 PROCEDURE — 99213 OFFICE O/P EST LOW 20 MIN: CPT

## 2025-07-23 ENCOUNTER — NON-APPOINTMENT (OUTPATIENT)
Age: 85
End: 2025-07-23

## 2025-07-29 ENCOUNTER — APPOINTMENT (OUTPATIENT)
Dept: PHARMACY | Facility: CLINIC | Age: 85
End: 2025-07-29
Payer: SELF-PAY

## 2025-07-29 PROCEDURE — V5299A: CUSTOM

## 2025-09-16 ENCOUNTER — RESULT REVIEW (OUTPATIENT)
Age: 85
End: 2025-09-16

## 2025-09-16 ENCOUNTER — APPOINTMENT (OUTPATIENT)
Dept: ORTHOPEDIC SURGERY | Facility: CLINIC | Age: 85
End: 2025-09-16
Payer: MEDICARE

## 2025-09-16 DIAGNOSIS — M17.12 UNILATERAL PRIMARY OSTEOARTHRITIS, LEFT KNEE: ICD-10-CM

## 2025-09-16 PROCEDURE — 20610 DRAIN/INJ JOINT/BURSA W/O US: CPT | Mod: LT

## 2025-09-16 PROCEDURE — 99213 OFFICE O/P EST LOW 20 MIN: CPT | Mod: 25

## 2025-09-17 PROBLEM — M17.12 PRIMARY OSTEOARTHRITIS OF LEFT KNEE: Status: ACTIVE | Noted: 2025-09-17
